# Patient Record
Sex: FEMALE | Race: WHITE | URBAN - METROPOLITAN AREA
[De-identification: names, ages, dates, MRNs, and addresses within clinical notes are randomized per-mention and may not be internally consistent; named-entity substitution may affect disease eponyms.]

---

## 2021-08-22 ENCOUNTER — EMERGENCY (EMERGENCY)
Facility: HOSPITAL | Age: 86
LOS: 0 days | Discharge: HOME | End: 2021-08-22
Attending: EMERGENCY MEDICINE
Payer: MEDICARE

## 2021-08-22 VITALS
TEMPERATURE: 99 F | HEART RATE: 78 BPM | SYSTOLIC BLOOD PRESSURE: 186 MMHG | OXYGEN SATURATION: 98 % | RESPIRATION RATE: 17 BRPM | WEIGHT: 117.95 LBS | DIASTOLIC BLOOD PRESSURE: 88 MMHG | HEIGHT: 59 IN
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
## 2021-08-22 DIAGNOSIS — S06.9X9A UNSPECIFIED INTRACRANIAL INJURY WITH LOSS OF CONSCIOUSNESS OF UNSPECIFIED DURATION, INITIAL ENCOUNTER: ICD-10-CM

## 2021-08-22 DIAGNOSIS — S09.90XA UNSPECIFIED INJURY OF HEAD, INITIAL ENCOUNTER: ICD-10-CM

## 2021-08-22 DIAGNOSIS — N39.0 URINARY TRACT INFECTION, SITE NOT SPECIFIED: ICD-10-CM
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
## 2021-08-22 DIAGNOSIS — S00.93XA CONTUSION OF UNSPECIFIED PART OF HEAD, INITIAL ENCOUNTER: ICD-10-CM

## 2021-08-22 LAB
ALBUMIN SERPL ELPH-MCNC: 3.7 G/DL — SIGNIFICANT CHANGE UP (ref 3.3–5)
ALP SERPL-CCNC: 58 U/L — SIGNIFICANT CHANGE UP (ref 40–120)
ALT FLD-CCNC: 23 U/L — SIGNIFICANT CHANGE UP (ref 12–78)
ANION GAP SERPL CALC-SCNC: 2 MMOL/L — LOW (ref 5–17)
APPEARANCE UR: CLEAR — SIGNIFICANT CHANGE UP
APTT BLD: 31.9 SEC — SIGNIFICANT CHANGE UP (ref 27.5–35.5)
AST SERPL-CCNC: 20 U/L — SIGNIFICANT CHANGE UP (ref 15–37)
BILIRUB SERPL-MCNC: 0.5 MG/DL — SIGNIFICANT CHANGE UP (ref 0.2–1.2)
BILIRUB UR-MCNC: NEGATIVE — SIGNIFICANT CHANGE UP
BUN SERPL-MCNC: 21 MG/DL — SIGNIFICANT CHANGE UP (ref 7–23)
CALCIUM SERPL-MCNC: 8.7 MG/DL — SIGNIFICANT CHANGE UP (ref 8.5–10.1)
CHLORIDE SERPL-SCNC: 110 MMOL/L — HIGH (ref 96–108)
CO2 SERPL-SCNC: 27 MMOL/L — SIGNIFICANT CHANGE UP (ref 22–31)
COLOR SPEC: YELLOW — SIGNIFICANT CHANGE UP
CREAT SERPL-MCNC: 0.76 MG/DL — SIGNIFICANT CHANGE UP (ref 0.5–1.3)
DIFF PNL FLD: ABNORMAL
GLUCOSE SERPL-MCNC: 99 MG/DL — SIGNIFICANT CHANGE UP (ref 70–99)
GLUCOSE UR QL: NEGATIVE MG/DL — SIGNIFICANT CHANGE UP
HCT VFR BLD CALC: 34.7 % — SIGNIFICANT CHANGE UP (ref 34.5–45)
HGB BLD-MCNC: 11.4 G/DL — LOW (ref 11.5–15.5)
INR BLD: 1.09 RATIO — SIGNIFICANT CHANGE UP (ref 0.88–1.16)
KETONES UR-MCNC: NEGATIVE — SIGNIFICANT CHANGE UP
LEUKOCYTE ESTERASE UR-ACNC: ABNORMAL
MCHC RBC-ENTMCNC: 31 PG — SIGNIFICANT CHANGE UP (ref 27–34)
MCHC RBC-ENTMCNC: 32.9 GM/DL — SIGNIFICANT CHANGE UP (ref 32–36)
MCV RBC AUTO: 94.3 FL — SIGNIFICANT CHANGE UP (ref 80–100)
NITRITE UR-MCNC: NEGATIVE — SIGNIFICANT CHANGE UP
PH UR: 7 — SIGNIFICANT CHANGE UP (ref 5–8)
PLATELET # BLD AUTO: 190 K/UL — SIGNIFICANT CHANGE UP (ref 150–400)
POTASSIUM SERPL-MCNC: 3.6 MMOL/L — SIGNIFICANT CHANGE UP (ref 3.5–5.3)
POTASSIUM SERPL-SCNC: 3.6 MMOL/L — SIGNIFICANT CHANGE UP (ref 3.5–5.3)
PROT SERPL-MCNC: 6.8 GM/DL — SIGNIFICANT CHANGE UP (ref 6–8.3)
PROT UR-MCNC: NEGATIVE MG/DL — SIGNIFICANT CHANGE UP
PROTHROM AB SERPL-ACNC: 12.7 SEC — SIGNIFICANT CHANGE UP (ref 10.6–13.6)
RBC # BLD: 3.68 M/UL — LOW (ref 3.8–5.2)
RBC # FLD: 13.6 % — SIGNIFICANT CHANGE UP (ref 10.3–14.5)
SODIUM SERPL-SCNC: 139 MMOL/L — SIGNIFICANT CHANGE UP (ref 135–145)
SP GR SPEC: 1.01 — SIGNIFICANT CHANGE UP (ref 1.01–1.02)
TROPONIN I SERPL-MCNC: <0.015 NG/ML — SIGNIFICANT CHANGE UP (ref 0.01–0.04)
TROPONIN I SERPL-MCNC: <0.015 NG/ML — SIGNIFICANT CHANGE UP (ref 0.01–0.04)
UROBILINOGEN FLD QL: NEGATIVE MG/DL — SIGNIFICANT CHANGE UP
WBC # BLD: 7.62 K/UL — SIGNIFICANT CHANGE UP (ref 3.8–10.5)
WBC # FLD AUTO: 7.62 K/UL — SIGNIFICANT CHANGE UP (ref 3.8–10.5)

## 2021-08-22 PROCEDURE — 99284 EMERGENCY DEPT VISIT MOD MDM: CPT | Mod: 25

## 2021-08-22 PROCEDURE — 36415 COLL VENOUS BLD VENIPUNCTURE: CPT

## 2021-08-22 PROCEDURE — 70450 CT HEAD/BRAIN W/O DYE: CPT | Mod: 26,MB

## 2021-08-22 PROCEDURE — 93010 ELECTROCARDIOGRAM REPORT: CPT

## 2021-08-22 PROCEDURE — 85610 PROTHROMBIN TIME: CPT

## 2021-08-22 PROCEDURE — 81001 URINALYSIS AUTO W/SCOPE: CPT

## 2021-08-22 PROCEDURE — 85730 THROMBOPLASTIN TIME PARTIAL: CPT

## 2021-08-22 PROCEDURE — 80053 COMPREHEN METABOLIC PANEL: CPT

## 2021-08-22 PROCEDURE — 70450 CT HEAD/BRAIN W/O DYE: CPT

## 2021-08-22 PROCEDURE — 84484 ASSAY OF TROPONIN QUANT: CPT | Mod: 91

## 2021-08-22 PROCEDURE — U0005: CPT

## 2021-08-22 PROCEDURE — 99284 EMERGENCY DEPT VISIT MOD MDM: CPT

## 2021-08-22 PROCEDURE — 87086 URINE CULTURE/COLONY COUNT: CPT

## 2021-08-22 PROCEDURE — 73502 X-RAY EXAM HIP UNI 2-3 VIEWS: CPT | Mod: RT

## 2021-08-22 PROCEDURE — 85027 COMPLETE CBC AUTOMATED: CPT

## 2021-08-22 PROCEDURE — 93005 ELECTROCARDIOGRAM TRACING: CPT

## 2021-08-22 PROCEDURE — 73502 X-RAY EXAM HIP UNI 2-3 VIEWS: CPT | Mod: 26,RT

## 2021-08-22 PROCEDURE — U0003: CPT

## 2021-08-22 RX ORDER — NITROFURANTOIN MACROCRYSTAL 50 MG
1 CAPSULE ORAL
Qty: 14 | Refills: 0
Start: 2021-08-22 | End: 2021-08-28

## 2021-08-22 RX ORDER — NITROFURANTOIN MACROCRYSTAL 50 MG
100 CAPSULE ORAL ONCE
Refills: 0 | Status: COMPLETED | OUTPATIENT
Start: 2021-08-22 | End: 2021-08-22

## 2021-08-22 RX ADMIN — Medication 100 MILLIGRAM(S): at 22:00

## 2021-08-22 NOTE — ED ADULT TRIAGE NOTE - CHIEF COMPLAINT QUOTE
patient BIBA s/p unwitnessed trip and fall at home. + head trauma +LOC, no complaints of pain at this time. Neurp alert called by chalo Romeo brought directly to CT

## 2021-08-22 NOTE — ED ADULT NURSE NOTE - BREATH SOUNDS, MLM
March 30, 2017      Joycelyn Vásquez MD  [address]  [facility] [address]           [facility] - Rheumatology  [address]  [facility] [address]  Phone: [phone]  Fax: [phone]          Patient: Sera Boone   MR Number: [id number]   YOB: 1935   Date of Visit: 3/30/2017       Dear Dr. Joycelyn Vásquez:    Thank you for referring Sera Boone to me for evaluation. Attached you will find relevant portions of my assessment and plan of care.    If you have questions, please do not hesitate to call me. I look forward to following Sera Boone along with you.    Sincerely,    Dayday Cheema MD    Enclosure  CC:  No Recipients    If you would like to receive this communication electronically, please contact [email or web address] or [phone] to request more information on [employer] access.    For providers and/or their staff who would like to refer a patient to [facility], please contact us through our one-stop-shop provider referral line, [facility], at [phone].    If you feel you have received this communication in error or would no longer like to receive these types of communications, please e-mail [email or web address]          Clear no

## 2021-08-22 NOTE — ED PROVIDER NOTE - CHIEF COMPLAINT
[facility] Call Center    Phone Message    May a detailed message be left on voicemail: yes     Reason for Call: Other: Pt was told she woould receive a third Rx, something for cold sores, nothing sent to [facility]vy. Please call to discuss     Action Taken: Message routed to:  Other: [facility] Derm    Travel Screening: Not Applicable                                                                       The patient is a 89y Female complaining of fall.

## 2021-08-22 NOTE — ED ADULT NURSE NOTE - OBJECTIVE STATEMENT
Patient called back and scheduled appointment for Wednesday, Ginger 10 at 8:40am with Dr. Malagon.     pt is 89 yo female bibems from home presents to ED for mechanical fall, pt states she tripped and fell, denies loc, denies any pain, no blood thinners.  Neuro alert initiated upon arrival to ED, pt sent to CT.

## 2021-08-22 NOTE — ED ADULT NURSE REASSESSMENT NOTE - NS ED NURSE REASSESS COMMENT FT1
Pt A&Ox4, received in stretcher anxious and wanting to go home. Plan of care and diagnostic testing reviewed with pt. Pt demonstrated understanding through teach back and was reassured. Pt assisted to bathroom with sarasteady. Pt in front of nursing station to maintain safety.

## 2021-08-22 NOTE — ED PROVIDER NOTE - PATIENT PORTAL LINK FT
You can access the FollowMyHealth Patient Portal offered by [facility] by registering at the following website: [email or web address]. By joining [employer]’s FollowMyHealth portal, you will also be able to view your health information using other applications (apps) compatible with our system.

## 2021-08-22 NOTE — ED ADULT NURSE REASSESSMENT NOTE - NS ED NURSE REASSESS COMMENT FT1
Second troponin drawn. Waiting for result. Plan of care discussed with karo Brasher informed of wait time.

## 2021-08-22 NOTE — ED ADULT NURSE REASSESSMENT NOTE - NS ED NURSE REASSESS COMMENT FT1
Spoke with chip Gan on the phone. Please call Elvia at [phone] if pt will be admitted. Please call johan Ann at [phone] (home) or [phone] (cell) if pt is discharged.

## 2021-08-22 NOTE — ED PROVIDER NOTE - OBJECTIVE STATEMENT
89 y/o female BIBA s/p unwitnessed fall at home. +Head trauma. +LOC. No other complaints at this time. Pt does not remember the event happening, or if she passed out. Pt lives alone. There are no other complaints at this time. NKDA.

## 2021-08-22 NOTE — ED PROVIDER NOTE - NSFOLLOWUPINSTRUCTIONS_ED_ALL_ED_FT
Contusion    A contusion is a deep bruise. Contusions are the result of a blunt injury to tissues and muscle fibers under the skin. The skin overlying the contusion may turn blue, purple, or yellow. Symptoms also include pain and swelling in the injured area.    SEEK IMMEDIATE MEDICAL CARE IF YOU HAVE ANY OF THE FOLLOWING SYMPTOMS: severe pain, numbness, tingling, pain, weakness, or skin color/temperature change in any part of your body distal to the injury.     Follow with PMD asap, return for any new or worsening symptoms Contusion, possible UTI    A contusion is a deep bruise. Contusions are the result of a blunt injury to tissues and muscle fibers under the skin. The skin overlying the contusion may turn blue, purple, or yellow. Symptoms also include pain and swelling in the injured area.    SEEK IMMEDIATE MEDICAL CARE IF YOU HAVE ANY OF THE FOLLOWING SYMPTOMS: severe pain, numbness, tingling, pain, weakness, or skin color/temperature change in any part of your body distal to the injury.     Macrobid prescribed    Follow with PMD asap, return for any new or worsening symptoms

## 2021-08-22 NOTE — ED PROVIDER NOTE - ENMT, MLM
Head atraumatic, normocephalic. No obvious signs of swelling/deformity. Battles negative. Raccoons negative. Airway patent, Nasal mucosa clear. Mouth with normal mucosa. Throat has no vesicles, no oropharyngeal exudates and uvula is midline.

## 2021-08-23 LAB
CULTURE RESULTS: SIGNIFICANT CHANGE UP
SPECIMEN SOURCE: SIGNIFICANT CHANGE UP

## 2022-12-05 ENCOUNTER — INPATIENT (INPATIENT)
Facility: HOSPITAL | Age: 87
LOS: 3 days | Discharge: HOME | DRG: 564 | End: 2022-12-09
Attending: HOSPITALIST | Admitting: INTERNAL MEDICINE
Payer: MEDICARE

## 2022-12-05 VITALS
WEIGHT: 119.93 LBS | HEART RATE: 91 BPM | DIASTOLIC BLOOD PRESSURE: 85 MMHG | OXYGEN SATURATION: 100 % | RESPIRATION RATE: 18 BRPM | HEIGHT: 66 IN | SYSTOLIC BLOOD PRESSURE: 155 MMHG

## 2022-12-05 LAB
ALBUMIN SERPL ELPH-MCNC: 3.3 G/DL — SIGNIFICANT CHANGE UP (ref 3.3–5)
ALP SERPL-CCNC: 61 U/L — SIGNIFICANT CHANGE UP (ref 40–120)
ALT FLD-CCNC: 22 U/L — SIGNIFICANT CHANGE UP (ref 12–78)
ANION GAP SERPL CALC-SCNC: 7 MMOL/L — SIGNIFICANT CHANGE UP (ref 5–17)
APPEARANCE UR: CLEAR — SIGNIFICANT CHANGE UP
AST SERPL-CCNC: 26 U/L — SIGNIFICANT CHANGE UP (ref 15–37)
BASOPHILS # BLD AUTO: 0.02 K/UL — SIGNIFICANT CHANGE UP (ref 0–0.2)
BASOPHILS NFR BLD AUTO: 0.1 % — SIGNIFICANT CHANGE UP (ref 0–2)
BILIRUB SERPL-MCNC: 0.8 MG/DL — SIGNIFICANT CHANGE UP (ref 0.2–1.2)
BILIRUB UR-MCNC: NEGATIVE — SIGNIFICANT CHANGE UP
BUN SERPL-MCNC: 23 MG/DL — SIGNIFICANT CHANGE UP (ref 7–23)
CALCIUM SERPL-MCNC: 8.7 MG/DL — SIGNIFICANT CHANGE UP (ref 8.5–10.1)
CHLORIDE SERPL-SCNC: 105 MMOL/L — SIGNIFICANT CHANGE UP (ref 96–108)
CK SERPL-CCNC: 527 U/L — HIGH (ref 26–192)
CO2 SERPL-SCNC: 26 MMOL/L — SIGNIFICANT CHANGE UP (ref 22–31)
COLOR SPEC: YELLOW — SIGNIFICANT CHANGE UP
CREAT SERPL-MCNC: 0.6 MG/DL — SIGNIFICANT CHANGE UP (ref 0.5–1.3)
DIFF PNL FLD: ABNORMAL
EGFR: 82 ML/MIN/1.73M2 — SIGNIFICANT CHANGE UP
EOSINOPHIL # BLD AUTO: 0 K/UL — SIGNIFICANT CHANGE UP (ref 0–0.5)
EOSINOPHIL NFR BLD AUTO: 0 % — SIGNIFICANT CHANGE UP (ref 0–6)
FLUAV AG NPH QL: SIGNIFICANT CHANGE UP
FLUBV AG NPH QL: SIGNIFICANT CHANGE UP
GLUCOSE SERPL-MCNC: 128 MG/DL — HIGH (ref 70–99)
GLUCOSE UR QL: NEGATIVE — SIGNIFICANT CHANGE UP
HCT VFR BLD CALC: 33.3 % — LOW (ref 34.5–45)
HGB BLD-MCNC: 10.9 G/DL — LOW (ref 11.5–15.5)
IMM GRANULOCYTES NFR BLD AUTO: 0.4 % — SIGNIFICANT CHANGE UP (ref 0–0.9)
KETONES UR-MCNC: ABNORMAL
LEUKOCYTE ESTERASE UR-ACNC: NEGATIVE — SIGNIFICANT CHANGE UP
LYMPHOCYTES # BLD AUTO: 0.3 K/UL — LOW (ref 1–3.3)
LYMPHOCYTES # BLD AUTO: 2.2 % — LOW (ref 13–44)
MAGNESIUM SERPL-MCNC: 2 MG/DL — SIGNIFICANT CHANGE UP (ref 1.6–2.6)
MCHC RBC-ENTMCNC: 30.2 PG — SIGNIFICANT CHANGE UP (ref 27–34)
MCHC RBC-ENTMCNC: 32.7 GM/DL — SIGNIFICANT CHANGE UP (ref 32–36)
MCV RBC AUTO: 92.2 FL — SIGNIFICANT CHANGE UP (ref 80–100)
MONOCYTES # BLD AUTO: 0.8 K/UL — SIGNIFICANT CHANGE UP (ref 0–0.9)
MONOCYTES NFR BLD AUTO: 5.8 % — SIGNIFICANT CHANGE UP (ref 2–14)
NEUTROPHILS # BLD AUTO: 12.51 K/UL — HIGH (ref 1.8–7.4)
NEUTROPHILS NFR BLD AUTO: 91.5 % — HIGH (ref 43–77)
NITRITE UR-MCNC: NEGATIVE — SIGNIFICANT CHANGE UP
NT-PROBNP SERPL-SCNC: 509 PG/ML — HIGH (ref 0–450)
PH UR: 6.5 — SIGNIFICANT CHANGE UP (ref 5–8)
PLATELET # BLD AUTO: 184 K/UL — SIGNIFICANT CHANGE UP (ref 150–400)
POTASSIUM SERPL-MCNC: 3.9 MMOL/L — SIGNIFICANT CHANGE UP (ref 3.5–5.3)
POTASSIUM SERPL-SCNC: 3.9 MMOL/L — SIGNIFICANT CHANGE UP (ref 3.5–5.3)
PROT SERPL-MCNC: 6.7 GM/DL — SIGNIFICANT CHANGE UP (ref 6–8.3)
PROT UR-MCNC: 15
RBC # BLD: 3.61 M/UL — LOW (ref 3.8–5.2)
RBC # FLD: 13.6 % — SIGNIFICANT CHANGE UP (ref 10.3–14.5)
RSV RNA NPH QL NAA+NON-PROBE: SIGNIFICANT CHANGE UP
SARS-COV-2 RNA SPEC QL NAA+PROBE: SIGNIFICANT CHANGE UP
SODIUM SERPL-SCNC: 138 MMOL/L — SIGNIFICANT CHANGE UP (ref 135–145)
SP GR SPEC: 1.01 — SIGNIFICANT CHANGE UP (ref 1.01–1.02)
TROPONIN I, HIGH SENSITIVITY RESULT: 197.82 NG/L — HIGH
UROBILINOGEN FLD QL: NEGATIVE — SIGNIFICANT CHANGE UP
WBC # BLD: 13.68 K/UL — HIGH (ref 3.8–10.5)
WBC # FLD AUTO: 13.68 K/UL — HIGH (ref 3.8–10.5)

## 2022-12-05 PROCEDURE — 99285 EMERGENCY DEPT VISIT HI MDM: CPT | Mod: FS

## 2022-12-05 PROCEDURE — 72125 CT NECK SPINE W/O DYE: CPT | Mod: 26,MA

## 2022-12-05 PROCEDURE — 74177 CT ABD & PELVIS W/CONTRAST: CPT | Mod: 26,MA

## 2022-12-05 PROCEDURE — 70450 CT HEAD/BRAIN W/O DYE: CPT | Mod: 26,MA

## 2022-12-05 PROCEDURE — 71045 X-RAY EXAM CHEST 1 VIEW: CPT | Mod: 26

## 2022-12-05 PROCEDURE — 71260 CT THORAX DX C+: CPT | Mod: 26,MA

## 2022-12-05 PROCEDURE — 93010 ELECTROCARDIOGRAM REPORT: CPT

## 2022-12-05 NOTE — ED PROVIDER NOTE - ATTENDING APP SHARED VISIT CONTRIBUTION OF CARE
Refilled Amlodipine per protocol per fax request.    Last filled 09/15/2017  Last seen 09/14/2017  Upcoming appt. 06/14/18 Pt eval, treatment and plan contemporaneously with ACP Ali. Agree with notes. Marifer FINLEY

## 2022-12-05 NOTE — ED PROVIDER NOTE - SKIN, MLM
Skin normal color for race, warm, dry and intact. No evidence of rash. Swelling and tenderness left occipital area.

## 2022-12-05 NOTE — ED PROVIDER NOTE - CLINICAL SUMMARY MEDICAL DECISION MAKING FREE TEXT BOX
Pt here s/p fall with head injury. Plan: labs, CT, reeval. Pt here s/p fall with head injury. Plan: labs, CT, reeval.    CT head NEG. +Elevated Troponin. spoke with hospitalist dr. Crain, will admit patient. ~Alejandro Cisneros PA-C

## 2022-12-05 NOTE — ED PROVIDER NOTE - PROGRESS NOTE DETAILS
spoke with hospitalist dr. Crain, will admit patient. ~Alejandro Cisneros PA-C · HPI Objective Statement: 89 y/o female with a PMHx of HTN, HLD presents to the ED s/p mechanical fall. Pt fell backwards and hit her head. No LOC. Pt was on the floor for 24 hours. No other complaints at this time. PA: PAtient is a 89 y/o female with PMHx of HTN, HLD who presents to [facility] c/o head injury s/p mechanical fall. Pt fell backwards and hit her head. No LOC. Pt was on the floor for 24 hours. No other complaints at this time. ~Alejandro Cisneros PA-C CT head NEG. +Elevated Troponin. spoke with hospitalist dr. Crain, will admit patient. ~Alejandro Cisneros PA-C

## 2022-12-05 NOTE — ED PROVIDER NOTE - PHYSICAL EXAMINATION
PA NOTE: GEN: AOX3, NAD. HEENT: Throat clear. Airway is patent. EYES: PERRLA. EOMI. Head: +Mild tenderness posterior scalp area. No deformity. NECK: Supple, No JVD. FROM. C-spine non-tender. CV:S1S2, RRR, LUNGS: Non-labored breathing, no tachypnea. O2sat 100% RA. CTA b/l. No w/r/r. CHEST: Equal chest expansion and rise. No deformity. ABD: Soft, NT/ND, no rebound, no guarding. No CVAT. EXT: No e/c/c. 2+ distal pulses. SKIN: No rashes. NEURO: No focal deficits. CN II-XII intact. FROM. 5/5 motor and sensory. ~Alejandro Cisneros PA-C

## 2022-12-05 NOTE — ED PROVIDER NOTE - OBJECTIVE STATEMENT
89 y/o female with a PMHx of HTN, HLD presents to the ED s/p mechanical fall. Pt fell backwards and hit her head. No LOC. Pt was on the floor for 24 hours. No other complaints at this time.

## 2022-12-05 NOTE — ED PROVIDER NOTE - NS ED ATTENDING STATEMENT MOD
This was a shared visit with the [facility]ARA. I reviewed and verified the documentation and independently performed the documented:

## 2022-12-05 NOTE — ED ADULT NURSE NOTE - OBJECTIVE STATEMENT
Per pt she was trying to get into bed last night and lost her balance and fell down hitting front of head, denies LOC, states she was able to get back up after the fall. Per triage note pt was laying on the ground since fall and found by niece. Pt denies being on blood thinners, no active bleeding or deformity noted. Pt states she normally uses a cane

## 2022-12-05 NOTE — ED ADULT TRIAGE NOTE - CHIEF COMPLAINT QUOTE
Pt BIBEMS s/p fall. Per EMS pt was on the floor for 24 hours. Pt was found today by her niece. Pt is unsure how she fell. Pt stated that she has a bump on her head. Pt denies any LOC or blood thinner use.

## 2022-12-05 NOTE — ED PROVIDER NOTE - DIFFERENTIAL DIAGNOSIS
Differential Diagnosis Head injury, scalp contusion, elevated troponin due to acs, elevated troponin due to stunned myocardium, elevated cpk due to immobilization after fall.

## 2022-12-05 NOTE — ED ADULT NURSE NOTE - NSIMPLEMENTINTERV_GEN_ALL_ED
Implemented All Fall with Harm Risk Interventions:  [place] to call system. Call bell, personal items and telephone within reach. Instruct patient to call for assistance. Room bathroom lighting operational. Non-slip footwear when patient is off stretcher. Physically safe environment: no spills, clutter or unnecessary equipment. Stretcher in lowest position, wheels locked, appropriate side rails in place. Provide visual cue, wrist band, yellow gown, etc. Monitor gait and stability. Monitor for mental status changes and reorient to person, place, and time. Review medications for side effects contributing to fall risk. Reinforce activity limits and safety measures with patient and family. Provide visual clues: red socks.

## 2022-12-06 DIAGNOSIS — S09.90XA UNSPECIFIED INJURY OF HEAD, INITIAL ENCOUNTER: ICD-10-CM

## 2022-12-06 PROBLEM — Z78.9 OTHER SPECIFIED HEALTH STATUS: Chronic | Status: ACTIVE | Noted: 2021-08-28

## 2022-12-06 LAB
CK SERPL-CCNC: 1202 U/L — HIGH (ref 26–192)
HCT VFR BLD CALC: 38 % — SIGNIFICANT CHANGE UP (ref 34.5–45)
HGB BLD-MCNC: 12.6 G/DL — SIGNIFICANT CHANGE UP (ref 11.5–15.5)
MCHC RBC-ENTMCNC: 30.9 PG — SIGNIFICANT CHANGE UP (ref 27–34)
MCHC RBC-ENTMCNC: 33.2 GM/DL — SIGNIFICANT CHANGE UP (ref 32–36)
MCV RBC AUTO: 93.1 FL — SIGNIFICANT CHANGE UP (ref 80–100)
PLATELET # BLD AUTO: 216 K/UL — SIGNIFICANT CHANGE UP (ref 150–400)
RBC # BLD: 4.08 M/UL — SIGNIFICANT CHANGE UP (ref 3.8–5.2)
RBC # FLD: 13.9 % — SIGNIFICANT CHANGE UP (ref 10.3–14.5)
TROPONIN I, HIGH SENSITIVITY RESULT: 214.35 NG/L — HIGH
TROPONIN I, HIGH SENSITIVITY RESULT: 215.79 NG/L — HIGH
WBC # BLD: 12.26 K/UL — HIGH (ref 3.8–10.5)
WBC # FLD AUTO: 12.26 K/UL — HIGH (ref 3.8–10.5)

## 2022-12-06 PROCEDURE — 36415 COLL VENOUS BLD VENIPUNCTURE: CPT

## 2022-12-06 PROCEDURE — 99223 1ST HOSP IP/OBS HIGH 75: CPT

## 2022-12-06 PROCEDURE — 80048 BASIC METABOLIC PNL TOTAL CA: CPT

## 2022-12-06 PROCEDURE — 82550 ASSAY OF CK (CPK): CPT

## 2022-12-06 PROCEDURE — 93306 TTE W/DOPPLER COMPLETE: CPT

## 2022-12-06 PROCEDURE — 99285 EMERGENCY DEPT VISIT HI MDM: CPT | Mod: 25

## 2022-12-06 PROCEDURE — 84484 ASSAY OF TROPONIN QUANT: CPT

## 2022-12-06 PROCEDURE — 97163 PT EVAL HIGH COMPLEX 45 MIN: CPT | Mod: GP

## 2022-12-06 PROCEDURE — 97110 THERAPEUTIC EXERCISES: CPT | Mod: GP

## 2022-12-06 PROCEDURE — 97116 GAIT TRAINING THERAPY: CPT | Mod: GP

## 2022-12-06 PROCEDURE — U0005: CPT

## 2022-12-06 PROCEDURE — 85027 COMPLETE CBC AUTOMATED: CPT

## 2022-12-06 PROCEDURE — U0003: CPT

## 2022-12-06 PROCEDURE — 80061 LIPID PANEL: CPT

## 2022-12-06 RX ORDER — ATORVASTATIN CALCIUM 80 MG/1
10 TABLET, FILM COATED ORAL AT BEDTIME
Refills: 0 | Status: DISCONTINUED | OUTPATIENT
Start: 2022-12-06 | End: 2022-12-09

## 2022-12-06 RX ORDER — LANOLIN ALCOHOL/MO/W.PET/CERES
3 CREAM (GRAM) TOPICAL AT BEDTIME
Refills: 0 | Status: DISCONTINUED | OUTPATIENT
Start: 2022-12-06 | End: 2022-12-09

## 2022-12-06 RX ORDER — AMLODIPINE BESYLATE 2.5 MG/1
2.5 TABLET ORAL DAILY
Refills: 0 | Status: DISCONTINUED | OUTPATIENT
Start: 2022-12-06 | End: 2022-12-09

## 2022-12-06 RX ORDER — ACETAMINOPHEN 500 MG
650 TABLET ORAL EVERY 6 HOURS
Refills: 0 | Status: DISCONTINUED | OUTPATIENT
Start: 2022-12-06 | End: 2022-12-09

## 2022-12-06 RX ORDER — ENOXAPARIN SODIUM 100 MG/ML
40 INJECTION SUBCUTANEOUS EVERY 24 HOURS
Refills: 0 | Status: DISCONTINUED | OUTPATIENT
Start: 2022-12-06 | End: 2022-12-09

## 2022-12-06 RX ORDER — ASPIRIN/CALCIUM CARB/MAGNESIUM 324 MG
81 TABLET ORAL DAILY
Refills: 0 | Status: DISCONTINUED | OUTPATIENT
Start: 2022-12-06 | End: 2022-12-09

## 2022-12-06 RX ORDER — LATANOPROST 0.05 MG/ML
1 SOLUTION/ DROPS OPHTHALMIC; TOPICAL AT BEDTIME
Refills: 0 | Status: DISCONTINUED | OUTPATIENT
Start: 2022-12-06 | End: 2022-12-09

## 2022-12-06 RX ORDER — ONDANSETRON 8 MG/1
4 TABLET, FILM COATED ORAL EVERY 8 HOURS
Refills: 0 | Status: DISCONTINUED | OUTPATIENT
Start: 2022-12-06 | End: 2022-12-09

## 2022-12-06 RX ORDER — METOPROLOL TARTRATE 50 MG
25 TABLET ORAL THREE TIMES A DAY
Refills: 0 | Status: DISCONTINUED | OUTPATIENT
Start: 2022-12-06 | End: 2022-12-09

## 2022-12-06 RX ORDER — DORZOLAMIDE HYDROCHLORIDE TIMOLOL MALEATE 20; 5 MG/ML; MG/ML
1 SOLUTION/ DROPS OPHTHALMIC
Refills: 0 | Status: DISCONTINUED | OUTPATIENT
Start: 2022-12-06 | End: 2022-12-09

## 2022-12-06 RX ORDER — SODIUM CHLORIDE 9 MG/ML
1000 INJECTION INTRAMUSCULAR; INTRAVENOUS; SUBCUTANEOUS
Refills: 0 | Status: DISCONTINUED | OUTPATIENT
Start: 2022-12-06 | End: 2022-12-08

## 2022-12-06 RX ADMIN — ENOXAPARIN SODIUM 40 MILLIGRAM(S): 100 INJECTION SUBCUTANEOUS at 12:50

## 2022-12-06 RX ADMIN — ATORVASTATIN CALCIUM 10 MILLIGRAM(S): 80 TABLET, FILM COATED ORAL at 22:21

## 2022-12-06 RX ADMIN — Medication 3 MILLIGRAM(S): at 22:21

## 2022-12-06 RX ADMIN — Medication 25 MILLIGRAM(S): at 22:21

## 2022-12-06 RX ADMIN — SODIUM CHLORIDE 75 MILLILITER(S): 9 INJECTION INTRAMUSCULAR; INTRAVENOUS; SUBCUTANEOUS at 17:28

## 2022-12-06 RX ADMIN — Medication 25 MILLIGRAM(S): at 12:50

## 2022-12-06 RX ADMIN — AMLODIPINE BESYLATE 2.5 MILLIGRAM(S): 2.5 TABLET ORAL at 13:32

## 2022-12-06 RX ADMIN — LATANOPROST 1 DROP(S): 0.05 SOLUTION/ DROPS OPHTHALMIC; TOPICAL at 22:22

## 2022-12-06 RX ADMIN — DORZOLAMIDE HYDROCHLORIDE TIMOLOL MALEATE 1 DROP(S): 20; 5 SOLUTION/ DROPS OPHTHALMIC at 22:21

## 2022-12-06 RX ADMIN — Medication 81 MILLIGRAM(S): at 12:50

## 2022-12-06 NOTE — PHYSICAL THERAPY INITIAL EVALUATION ADULT - GENERAL OBSERVATIONS, REHAB EVAL
Pt rec'd supine in bed in NAD with HM (Sinus Tachycardia 100-120) Intact. Pt denied any pain or discomfort

## 2022-12-06 NOTE — H&P ADULT - ASSESSMENT
89 y/o female with PMHX of HTN, HLD who presented to  after fall at home.  Patient was at home and fell backwards hitting her head.  Patient states it was a mechanical fall-- no syncope.  No LOC.  After the fall, patient was unable to get up off the floor and was lying on floor for 24 hours.  Upon admission, patient had trauma work up which was negative for bleed/ fracture.  Labs with elevated 's and elevated trop of 197.  Patient referred for admission.   89 y/o female with PMHX of HTN, HLD who presented to  after fall at home. Upon admission, patient had trauma work up which was negative for bleed/ fracture.  Labs with elevated 's and elevated trop of 197.  Patient referred for admission.      #Mild Rhabdo/ Elevated Trop:    Admit to tele.    NS @ 75.    Cardio eval.    Patient denies CP.    EKG with sinus tach-- no ischemic changes.    ASA/ statin.    ECHO.    Trend labs.      #Fall:    Mechanical as per patient.  Lost balance.    Walks with cane at home.    PT eval.    Imaging negative for fractures/ bleed.    Check orthostatics.      #Leukocytosis:    Afebrile.    CT imaging without infection.    UA negative.    Repeat-- may be stress related.      #HTN:   Resume home meds.  BB and Norvasc.      #HLD:    Cont statin.      #Glaucoma:    Cont drops.      #DVT Proph:  Lovenox.      DISPO:  PT eval.  ? REHAB.  Patient lives alone.

## 2022-12-06 NOTE — PHYSICAL THERAPY INITIAL EVALUATION ADULT - MODALITIES TREATMENT COMMENTS
Pt left as rec'd supine in bed in NAD with HM Intact. CBIR. Pt instructed to not get up alone. Bed alarm activated. RN Coreen present and aware.

## 2022-12-06 NOTE — H&P ADULT - NSHPREVIEWOFSYSTEMS_GEN_ALL_CORE
ROS:  General:  No fevers, chills, or unexplained weight loss  Skin: No rash or bothersome skin lesions  Musculoskeletal: No arthalgias, myalgias or joint swelling  Eyes: No visual changes or eye pain  Ears: No hearing loss , otorrhea or ear pain  Nose, Mouth, Throat: No nasal congestion, rhinorrhea, oral lesions, postnasal drip or sore throat  Cardio: No chest pain or palpitations. no lower extremity edema. no syncope. no claudication.   Respiratory: No cough, shortness of breath or wheezing   GI: No diarrhea, constipation, blood in stools, abdominal pain, vomiting or heartburn  : No urinary frequency, hematuria, incontinence, or dysuria  Neurologic: No headaches, parasthesias, confusion, dysarthria or gait instability  Psychiatric:  No anxiety or depression  Lymphatic:  No easy bruising, easy bleeding or swollen glands  Allergic: No itching, sneezing , watery eyes, clear rhinorrhea or recurrent infections ROS:  General:  No fevers, chills, or unexplained weight loss  Skin: No rash or bothersome skin lesions  Musculoskeletal: No arthalgias, myalgias or joint swelling  Eyes: No visual changes or eye pain  Ears: No hearing loss , otorrhea or ear pain  Nose, Mouth, Throat: No nasal congestion, rhinorrhea, oral lesions, postnasal drip or sore throat  Cardio: No chest pain or palpitations. no lower extremity edema. no syncope. no claudication.   Respiratory: No cough, shortness of breath or wheezing   GI: No diarrhea, constipation, blood in stools, abdominal pain, vomiting or heartburn  : No urinary frequency, hematuria, incontinence, or dysuria  Neurologic: fall  Psychiatric:  No anxiety or depression  Lymphatic:  No easy bruising, easy bleeding or swollen glands  Allergic: No itching, sneezing , watery eyes, clear rhinorrhea or recurrent infections

## 2022-12-06 NOTE — H&P ADULT - NSHPLABSRESULTS_GEN_ALL_CORE
10.9   13.68 )-----------( 184      ( 05 Dec 2022 19:05 )             33.3     12-05    138  |  105  |  23  ----------------------------<  128<H>  3.9   |  26  |  0.60    Ca    8.7      05 Dec 2022 19:05  Mg     2.0     12-05    TPro  6.7  /  Alb  3.3  /  TBili  0.8  /  DBili  x   /  AST  26  /  ALT  22  /  AlkPhos  61  12-05    CAPILLARY BLOOD GLUCOSE          Urinalysis Basic - ( 05 Dec 2022 22:20 )    Color: Yellow / Appearance: Clear / S.010 / pH: x  Gluc: x / Ketone: Small  / Bili: Negative / Urobili: Negative   Blood: x / Protein: 15 / Nitrite: Negative   Leuk Esterase: Negative / RBC: 3-5 /HPF / WBC Negative /HPF   Sq Epi: x / Non Sq Epi: Negative / Bacteria: Occasional 10.9   13.68 )-----------( 184      ( 05 Dec 2022 19:05 )             33.3     12-05    138  |  105  |  23  ----------------------------<  128<H>  3.9   |  26  |  0.60    Ca    8.7      05 Dec 2022 19:05  Mg     2.0     12-05    TPro  6.7  /  Alb  3.3  /  TBili  0.8  /  DBili  x   /  AST  26  /  ALT  22  /  AlkPhos  61  12-05    Urinalysis Basic - ( 05 Dec 2022 22:20 )  Color: Yellow / Appearance: Clear / S.010 / pH: x  Gluc: x / Ketone: Small  / Bili: Negative / Urobili: Negative   Blood: x / Protein: 15 / Nitrite: Negative   Leuk Esterase: Negative / RBC: 3-5 /HPF / WBC Negative /HPF   Sq Epi: x / Non Sq Epi: Negative / Bacteria: Occasional

## 2022-12-06 NOTE — H&P ADULT - HISTORY OF PRESENT ILLNESS
89 y/o female with PMHX of HTN, HLD who presented to  after fall at home.  Patient was at home and fell backwards hitting her head.  Patient states it was a mechanical fall-- no syncope.  No LOC.  After the fall, patient was unable to get up off the floor and was lying on floor for 24 hours.  Upon admission, patient had trauma work up which was negative for bleed/ fracture.  Labs with elevated 's and elevated trop of 197.  Patient referred for admission.        PAST MEDICAL & SURGICAL HISTORY:  as above      FAMILY HISTORY:  Patient denies family history.        Social History:    Lives at home.    No tob/ etoh/ drug use.      Allergies:  No Known Allergies       89 y/o female with PMHX of HTN, HLD who presented to  after fall at home.  Patient was at home and fell backwards hitting her head.  Patient states it was a mechanical fall-- no syncope.  She states she was standing at the foot of her bed and lost her balance.  No LOC.  She hit her head on the bed when she fell.  After the fall, patient was unable to get up off the floor and was lying on floor for 24 hours.  This was per ER notes.  At present, patient doesn't recall how long she was on the ground and how she got the hospital.  Upon admission, patient had trauma work up which was negative for bleed/ fracture.  Labs with elevated 's and elevated trop of 197.  Patient referred for admission.  Patient denies cardiac history or CP in last 2 days at home.  No complaints.        PAST MEDICAL & SURGICAL HISTORY:  as above      FAMILY HISTORY:  Patient denies family history.        Social History:    Lives at home.  Alone.    No tob/ etoh/ drug use.      Allergies:  No Known Allergies

## 2022-12-06 NOTE — PHYSICAL THERAPY INITIAL EVALUATION ADULT - GAIT DEVIATIONS NOTED, PT EVAL
[facility] OOB PT FUNCTIONAL MOBILITY ASSESSMENT AT THIS TIME DUE TO ELEVATED HR (SINUS TACHYCARDIA 100-120) AND UPTRENDING TROPONIN LEVELS. WILL ATTEMPT TO ASSESS OOB AMBULATION AND COMPLETE PT EVAL AT A LATER DATE/TIME UPON IMPROVEMENT IN CARDIAC/MEDICAL STATUS. JIMBO IBARRA AWARE AND IN AGREEMENT WITH PLAN

## 2022-12-06 NOTE — ED ADULT NURSE REASSESSMENT NOTE - NS ED NURSE REASSESS COMMENT FT1
Patient resting comfortably on stretcher, AxO2, no complaints at this time. Continuing to monitor at this time per MD orders

## 2022-12-06 NOTE — PATIENT PROFILE ADULT - FALL HARM RISK - HARM RISK INTERVENTIONS

## 2022-12-06 NOTE — PHARMACOTHERAPY INTERVENTION NOTE - COMMENTS
Medication history complete, went off DrFirst, tried multiple times to contact Elvia at [phone] and it went right to voicemail.

## 2022-12-06 NOTE — H&P ADULT - NSHPPHYSICALEXAM_GEN_ALL_CORE
PEx  T(C): 36.4 (12-06-22 @ 06:52), Max: 36.7 (12-05-22 @ 23:15)  HR: 76 (12-06-22 @ 06:52) (72 - 91)  BP: 161/76 (12-06-22 @ 06:52) (144/79 - 161/76)  RR: 17 (12-06-22 @ 06:52) (16 - 18)  SpO2: 99% (12-06-22 @ 06:52) (99% - 100%)  Wt(kg): --  General:     Well appearing, well nourished in no distress, no identifying marks , scars, or tattoos.  Skin: no rash or prominent lesions  Head: normocephalic, atraumatic     Sinuses: non-tender  Nose: no external lesions, mucosa non-inflamed, septum and turbinates normal  Throat: no erythema, exudates or lesions.  Neck: Supple without lymphadenopathy. Thyroid no thyromegaly, no palpable thyroid nodules, no palpable nodules or masses, carotid arteries no bruits.   Breasts: No palpable masses or lesions.  Heart: RRR, no murmur or gallop.  Normal S1, S2.  No S3, S4.   Lungs: CTA bilaterally, no wheezes, rhonchi, rales.  Breathing unlabored.   Chest wall: Normal insp   Abdomen:  Soft, NT/ND, normal bowel sounds, no HSM, no masses.  No peritoneal signs.   Back: spine normal without deformity or tenderness.  Normal ROM   : Exam normal.  no inguinal hernias.  Extremities: No deformities, clubbing, cyanosis, or edema.  Musculoskeletal: Normal gait and station. No decreased range of motion, instability, atrophy or abnormal strength or tone in the head, neck, spine, ribs, pelvis or extremities.   Neurologic: CN 2-12 normal. Sensation to pain, touch and proprioception normal. DTRs normal in upper and lower extremities. No pathologic reflexes.  Motor normal.  Psychiatric: Oriented X3, intact recent and remote memory, judgement and insight, normal mood and affect. PEx  T(C): 36.4 (12-06-22 @ 06:52), Max: 36.7 (12-05-22 @ 23:15)  HR: 76 (12-06-22 @ 06:52) (72 - 91)  BP: 161/76 (12-06-22 @ 06:52) (144/79 - 161/76)  RR: 17 (12-06-22 @ 06:52) (16 - 18)  SpO2: 99% (12-06-22 @ 06:52) (99% - 100%)    General:  elderly female.  NAD  Skin: no rash or prominent lesions  Head: normocephalic, atraumatic     Sinuses: non-tender  Nose: no external lesions, mucosa non-inflamed, septum and turbinates normal  Throat: no erythema, exudates or lesions.  Neck: Supple without lymphadenopathy. Thyroid no thyromegaly, no palpable thyroid nodules, no palpable nodules or masses, carotid arteries no bruits.   Breasts: No palpable masses or lesions.  Heart: RRR, no murmur or gallop.  Normal S1, S2.  No S3, S4.   Lungs: CTA bilaterally, no wheezes, rhonchi, rales.  Breathing unlabored.   Chest wall: Normal insp   Abdomen:  Soft, NT/ND, normal bowel sounds, no HSM, no masses.  No peritoneal signs.   Back: spine normal without deformity or tenderness.  Normal ROM   : Exam normal.  no inguinal hernias.  Extremities: No deformities, clubbing, cyanosis, or edema.  Musculoskeletal: Normal gait and station. No decreased range of motion, instability, atrophy or abnormal strength or tone in the head, neck, spine, ribs, pelvis or extremities.   Neurologic: CN 2-12 normal. Sensation to pain, touch and proprioception normal. DTRs normal in upper and lower extremities. No pathologic reflexes.  Motor normal.  Psychiatric: Oriented X3, intact recent and remote memory, judgement and insight, normal mood and affect. PEx  T(C): 36.4 (12-06-22 @ 06:52), Max: 36.7 (12-05-22 @ 23:15)  HR: 76 (12-06-22 @ 06:52) (72 - 91)  BP: 161/76 (12-06-22 @ 06:52) (144/79 - 161/76)  RR: 17 (12-06-22 @ 06:52) (16 - 18)  SpO2: 99% (12-06-22 @ 06:52) (99% - 100%)    General:  elderly female.  NAD  Skin: no rash or prominent lesions  Head: normocephalic, atraumatic     Sinuses: non-tender  Nose: no external lesions, mucosa non-inflamed, septum and turbinates normal  Throat: no erythema, exudates or lesions.  Neck: Supple without lymphadenopathy. Thyroid no thyromegaly, no palpable thyroid nodules, no palpable nodules or masses, carotid arteries no bruits.   Breasts: No palpable masses or lesions.  Heart: RRR, no murmur or gallop.  Normal S1, S2.  No S3, S4.   Lungs: CTA bilaterally, no wheezes, rhonchi, rales.  Breathing unlabored.   Chest wall: Normal insp   Abdomen:  Soft, NT/ND, normal bowel sounds, no HSM, no masses.  No peritoneal signs.   Back: spine normal without deformity or tenderness.  Normal ROM   : Exam normal.  no inguinal hernias.  Extremities: No deformities, clubbing, cyanosis, or edema.  Musculoskeletal: Normal gait and station. No decreased range of motion, instability, atrophy or abnormal strength or tone in the head, neck, spine, ribs, pelvis or extremities.   Neurologic: CN 2-12 normal. Sensation to pain, touch and proprioception normal. DTRs normal in upper and lower extremities. No pathologic reflexes.  Motor normal.  Psychiatric: alert and awake.  confused about events that led to admission,

## 2022-12-07 LAB
ADD ON TEST-SPECIMEN IN LAB: SIGNIFICANT CHANGE UP
ANION GAP SERPL CALC-SCNC: 5 MMOL/L — SIGNIFICANT CHANGE UP (ref 5–17)
BUN SERPL-MCNC: 24 MG/DL — HIGH (ref 7–23)
CALCIUM SERPL-MCNC: 8.4 MG/DL — LOW (ref 8.5–10.1)
CHLORIDE SERPL-SCNC: 109 MMOL/L — HIGH (ref 96–108)
CHOLEST SERPL-MCNC: 185 MG/DL — SIGNIFICANT CHANGE UP
CO2 SERPL-SCNC: 25 MMOL/L — SIGNIFICANT CHANGE UP (ref 22–31)
CREAT SERPL-MCNC: 0.56 MG/DL — SIGNIFICANT CHANGE UP (ref 0.5–1.3)
EGFR: 83 ML/MIN/1.73M2 — SIGNIFICANT CHANGE UP
GLUCOSE SERPL-MCNC: 105 MG/DL — HIGH (ref 70–99)
HCT VFR BLD CALC: 33.1 % — LOW (ref 34.5–45)
HDLC SERPL-MCNC: 73 MG/DL — SIGNIFICANT CHANGE UP
HGB BLD-MCNC: 10.8 G/DL — LOW (ref 11.5–15.5)
LIPID PNL WITH DIRECT LDL SERPL: 100 MG/DL — HIGH
MCHC RBC-ENTMCNC: 30.3 PG — SIGNIFICANT CHANGE UP (ref 27–34)
MCHC RBC-ENTMCNC: 32.6 GM/DL — SIGNIFICANT CHANGE UP (ref 32–36)
MCV RBC AUTO: 93 FL — SIGNIFICANT CHANGE UP (ref 80–100)
NON HDL CHOLESTEROL: 112 MG/DL — SIGNIFICANT CHANGE UP
PLATELET # BLD AUTO: 190 K/UL — SIGNIFICANT CHANGE UP (ref 150–400)
POTASSIUM SERPL-MCNC: 4 MMOL/L — SIGNIFICANT CHANGE UP (ref 3.5–5.3)
POTASSIUM SERPL-SCNC: 4 MMOL/L — SIGNIFICANT CHANGE UP (ref 3.5–5.3)
RBC # BLD: 3.56 M/UL — LOW (ref 3.8–5.2)
RBC # FLD: 14.1 % — SIGNIFICANT CHANGE UP (ref 10.3–14.5)
SODIUM SERPL-SCNC: 139 MMOL/L — SIGNIFICANT CHANGE UP (ref 135–145)
TRIGL SERPL-MCNC: 59 MG/DL — SIGNIFICANT CHANGE UP
WBC # BLD: 11.18 K/UL — HIGH (ref 3.8–10.5)
WBC # FLD AUTO: 11.18 K/UL — HIGH (ref 3.8–10.5)

## 2022-12-07 PROCEDURE — 93306 TTE W/DOPPLER COMPLETE: CPT | Mod: 26

## 2022-12-07 PROCEDURE — 99233 SBSQ HOSP IP/OBS HIGH 50: CPT

## 2022-12-07 RX ADMIN — Medication 25 MILLIGRAM(S): at 22:04

## 2022-12-07 RX ADMIN — SODIUM CHLORIDE 75 MILLILITER(S): 9 INJECTION INTRAMUSCULAR; INTRAVENOUS; SUBCUTANEOUS at 05:18

## 2022-12-07 RX ADMIN — DORZOLAMIDE HYDROCHLORIDE TIMOLOL MALEATE 1 DROP(S): 20; 5 SOLUTION/ DROPS OPHTHALMIC at 22:05

## 2022-12-07 RX ADMIN — Medication 25 MILLIGRAM(S): at 05:19

## 2022-12-07 RX ADMIN — Medication 25 MILLIGRAM(S): at 15:34

## 2022-12-07 RX ADMIN — AMLODIPINE BESYLATE 2.5 MILLIGRAM(S): 2.5 TABLET ORAL at 10:48

## 2022-12-07 RX ADMIN — ENOXAPARIN SODIUM 40 MILLIGRAM(S): 100 INJECTION SUBCUTANEOUS at 10:48

## 2022-12-07 RX ADMIN — ATORVASTATIN CALCIUM 10 MILLIGRAM(S): 80 TABLET, FILM COATED ORAL at 22:04

## 2022-12-07 RX ADMIN — Medication 81 MILLIGRAM(S): at 10:48

## 2022-12-07 RX ADMIN — DORZOLAMIDE HYDROCHLORIDE TIMOLOL MALEATE 1 DROP(S): 20; 5 SOLUTION/ DROPS OPHTHALMIC at 15:45

## 2022-12-07 NOTE — PROGRESS NOTE ADULT - ASSESSMENT
89 y/o female with PMHX of HTN, HLD who presented to  after fall at home. Upon admission, patient had trauma work up which was negative for bleed/ fracture.  Labs with elevated 's and elevated trop of 197.  Patient referred for admission.      #Mild Rhabdo/ Elevated Trop:   -tele  -CPK downtrended  -trop plateaud  -echo unremarkable as above, EKG non ischemic   -IVF for mild rhabdo, can continue for now  -ASA, statin  -f/u cards recs    #Fall:    Mechanical as per patient.  Lost balance.    Walks with cane at home.  -orthostatics  -imaging neg for fx/traumatic injury  -PT eval      #Leukocytosis  -afebrile  -likely stress related, downtrending  -UA neg  -CT w/o source infection    #HTN:   -Resume home meds.  BB and Norvasc.      #HLD:    -Cont statin.      #Glaucoma:    -Cont drops.      #DVT pp- SQL    #?KIKI, possibly 12/8        89 y/o female with PMHX of HTN, HLD who presented to  after fall at home. Upon admission, patient had trauma work up which was negative for bleed/ fracture.  Labs with elevated 's and elevated trop of 197.  Patient referred for admission.      #Mild Rhabdo/ Elevated Trop:   -tele  -CPK downtrended  -trop plateaud  -echo unremarkable as above, EKG non ischemic   -IVF for mild rhabdo, can continue for now  -ASA, statin  -f/u cards recs    #Fall:    Mechanical as per patient.  Lost balance.    Walks with cane at home.  -orthostatics  -imaging neg for fx/traumatic injury  -PT eval      #Leukocytosis  -afebrile  -likely stress related, downtrending  -UA neg  -CT w/o source infection    #HTN:   -Resume home meds.  BB and Norvasc.      #HLD:    -Cont statin.      #Glaucoma:    -Cont drops.      #DVT pp- SQL    #?KIKI, possibly 12/8

## 2022-12-08 LAB — SARS-COV-2 RNA SPEC QL NAA+PROBE: SIGNIFICANT CHANGE UP

## 2022-12-08 PROCEDURE — 99232 SBSQ HOSP IP/OBS MODERATE 35: CPT

## 2022-12-08 RX ADMIN — Medication 25 MILLIGRAM(S): at 13:38

## 2022-12-08 RX ADMIN — Medication 25 MILLIGRAM(S): at 21:15

## 2022-12-08 RX ADMIN — DORZOLAMIDE HYDROCHLORIDE TIMOLOL MALEATE 1 DROP(S): 20; 5 SOLUTION/ DROPS OPHTHALMIC at 11:38

## 2022-12-08 RX ADMIN — ATORVASTATIN CALCIUM 10 MILLIGRAM(S): 80 TABLET, FILM COATED ORAL at 21:15

## 2022-12-08 RX ADMIN — AMLODIPINE BESYLATE 2.5 MILLIGRAM(S): 2.5 TABLET ORAL at 10:43

## 2022-12-08 RX ADMIN — ENOXAPARIN SODIUM 40 MILLIGRAM(S): 100 INJECTION SUBCUTANEOUS at 12:15

## 2022-12-08 RX ADMIN — DORZOLAMIDE HYDROCHLORIDE TIMOLOL MALEATE 1 DROP(S): 20; 5 SOLUTION/ DROPS OPHTHALMIC at 21:15

## 2022-12-08 RX ADMIN — Medication 81 MILLIGRAM(S): at 11:38

## 2022-12-08 RX ADMIN — LATANOPROST 1 DROP(S): 0.05 SOLUTION/ DROPS OPHTHALMIC; TOPICAL at 21:15

## 2022-12-08 RX ADMIN — Medication 25 MILLIGRAM(S): at 05:23

## 2022-12-08 NOTE — PROGRESS NOTE ADULT - SUBJECTIVE AND OBJECTIVE BOX
HOSPITALIST ATTENDING PROGRESS NOTE    Chart and meds reviewed.  Patient seen and examined.    CC: fall    Subjective: No new complaints. Denies CP, SOB, palpitations. Family reports heat in home will be fixed by tmrw.     All other systems reviewed and found to be negative with the exception of what has been described above.    MEDICATIONS  (STANDING):  amLODIPine   Tablet 2.5 milliGRAM(s) Oral daily  aspirin enteric coated 81 milliGRAM(s) Oral daily  atorvastatin 10 milliGRAM(s) Oral at bedtime  dorzolamide 2%/timolol 0.5% Ophthalmic Solution 1 Drop(s) Both EYES two times a day  enoxaparin Injectable 40 milliGRAM(s) SubCutaneous every 24 hours  latanoprost 0.005% Ophthalmic Solution 1 Drop(s) Both EYES at bedtime  metoprolol tartrate 25 milliGRAM(s) Oral three times a day    MEDICATIONS  (PRN):  acetaminophen     Tablet .. 650 milliGRAM(s) Oral every 6 hours PRN Temp greater or equal to 38C (100.4F), Mild Pain (1 - 3)  aluminum hydroxide/magnesium hydroxide/simethicone Suspension 30 milliLiter(s) Oral every 4 hours PRN Dyspepsia  melatonin 3 milliGRAM(s) Oral at bedtime PRN Insomnia  ondansetron Injectable 4 milliGRAM(s) IV Push every 8 hours PRN Nausea and/or Vomiting      VITALS:  T(F): 98.2 (12-08-22 @ 07:58), Max: 98.4 (12-07-22 @ 20:14)  HR: 78 (12-08-22 @ 13:37) (78 - 92)  BP: 116/66 (12-08-22 @ 13:37) (116/66 - 161/64)  RR: 18 (12-08-22 @ 13:37) (17 - 18)  SpO2: 93% (12-08-22 @ 13:37) (93% - 95%)  Wt(kg): --    I&O's Summary    07 Dec 2022 07:01  -  08 Dec 2022 07:00  --------------------------------------------------------  IN: 1230 mL / OUT: 2 mL / NET: 1228 mL        CAPILLARY BLOOD GLUCOSE          PHYSICAL EXAM:  Gen: NAD  HEENT:  pupils equal and reactive, EOMI, no oropharyngeal lesions, erythema, exudates, oral thrush  NECK:   supple, no carotid bruits, no palpable lymph nodes, no thyromegaly  CV:  +S1, +S2, regular, no murmurs or rubs  RESP:   lungs clear to auscultation bilaterally, no wheezing, rales, rhonchi, good air entry bilaterally  BREAST:  not examined  GI:  abdomen soft, non-tender, non-distended, normal BS, no bruits, no abdominal masses, no palpable masses  RECTAL:  not examined  :  not examined  MSK:   normal muscle tone, no atrophy, no rigidity, no contractions  EXT:  no clubbing, no cyanosis, no edema, no calf pain, swelling or erythema  VASCULAR:  pulses equal and symmetric in the upper and lower extremities  NEURO:  AAOX3, no focal neurological deficits, follows all commands, able to move extremities spontaneously  SKIN:  no ulcers, lesions or rashes    LABS:                            10.8   11.18 )-----------( 190      ( 07 Dec 2022 06:37 )             33.1     12-07    139  |  109<H>  |  24<H>  ----------------------------<  105<H>  4.0   |  25  |  0.56    Ca    8.4<L>      07 Dec 2022 06:37      CARDIAC MARKERS ( 07 Dec 2022 06:37 )  x     / x     / 705 U/L / x     / x      CARDIAC MARKERS ( 06 Dec 2022 17:28 )  x     / x     / 1202 U/L / x     / x        CULTURES:  no new    Additional results/Imaging, I have personally reviewed:  CXR 12/5/22: No acute findings in this study or concurrent chest CT    CTH, CT cspine 12/5/22: CT HEAD: No CT evidence of acute intracranial hemorrhage. Atherosclerotic changes. If patient demonstrates persistent headaches or altered mental status, consider further evaluation via MR imaging to include DWI and ADC mapping techniques, along with gradient echo acquisition technique, provided there are no contraindications. CT CERVICAL SPINE:  No acute fracture. No AP plane subluxation. Multilevel degenerative changes, as described level by level in detail above.  If there is clinical concern for myelopathy or radiculopathy, consider further evaluation via MR imaging of the cervical spine, provided the patient has no contraindications.    CT c/a/p w iv contrast 12/5/22: No acute abnormality.    Echo 12/7/22: The left ventricle is normal in size, wall thickness, wall motion but does appear mildly hyperdynamic. Estimated left ventricularejection fraction is 70 %. Normal appearing left atrium. Normal appearing right ventricle structure and function. The aortic valve is well visualized, appears sclerotic. Valve opening seems to be normal. Mild (1+) aortic regurgitation is present. The mitral valve leaflets appear thickened. EA reversal of the mitral inflow consistent with reduced compliance of the left ventricle. Mild (1+) mitral regurgitation is present. The tricuspid valve leaflets appear mildly thickened and/or calcified, but open well. Mild (1+) tricuspid valve regurgitation is present.    Telemetry, personally reviewed:  12/7/22: sinus , PVCs, PACs  12/8/22: sinus , APCs, d/c tele  
HOSPITALIST ATTENDING PROGRESS NOTE    Chart and meds reviewed.  Patient seen and examined.    CC: fall    Subjective: Reports feeling well, ok appetite. Denies dizziness, CP, SOB.    All other systems reviewed and found to be negative with the exception of what has been described above.    MEDICATIONS  (STANDING):  amLODIPine   Tablet 2.5 milliGRAM(s) Oral daily  aspirin enteric coated 81 milliGRAM(s) Oral daily  atorvastatin 10 milliGRAM(s) Oral at bedtime  dorzolamide 2%/timolol 0.5% Ophthalmic Solution 1 Drop(s) Both EYES two times a day  enoxaparin Injectable 40 milliGRAM(s) SubCutaneous every 24 hours  latanoprost 0.005% Ophthalmic Solution 1 Drop(s) Both EYES at bedtime  metoprolol tartrate 25 milliGRAM(s) Oral three times a day  sodium chloride 0.9%. 1000 milliLiter(s) (75 mL/Hr) IV Continuous <Continuous>    MEDICATIONS  (PRN):  acetaminophen     Tablet .. 650 milliGRAM(s) Oral every 6 hours PRN Temp greater or equal to 38C (100.4F), Mild Pain (1 - 3)  aluminum hydroxide/magnesium hydroxide/simethicone Suspension 30 milliLiter(s) Oral every 4 hours PRN Dyspepsia  melatonin 3 milliGRAM(s) Oral at bedtime PRN Insomnia  ondansetron Injectable 4 milliGRAM(s) IV Push every 8 hours PRN Nausea and/or Vomiting      VITALS:  T(F): 97.7 (22 @ 08:00), Max: 98.4 (22 @ 20:05)  HR: 73 (22 @ 08:00) (69 - 81)  BP: 136/67 (22 @ 08:00) (128/65 - 136/67)  RR: 18 (22 @ 08:00) (18 - 18)  SpO2: 96% (22 @ 08:00) (96% - 100%)  Wt(kg): --    I&O's Summary    06 Dec 2022 07:01  -  07 Dec 2022 07:00  --------------------------------------------------------  IN: 825 mL / OUT: 600 mL / NET: 225 mL        CAPILLARY BLOOD GLUCOSE          PHYSICAL EXAM:  Gen: NAD  HEENT:  pupils equal and reactive, EOMI, no oropharyngeal lesions, erythema, exudates, oral thrush  NECK:   supple, no carotid bruits, no palpable lymph nodes, no thyromegaly  CV:  +S1, +S2, regular, no murmurs or rubs  RESP:   lungs clear to auscultation bilaterally, no wheezing, rales, rhonchi, good air entry bilaterally  BREAST:  not examined  GI:  abdomen soft, non-tender, non-distended, normal BS, no bruits, no abdominal masses, no palpable masses  RECTAL:  not examined  :  not examined  MSK:   normal muscle tone, no atrophy, no rigidity, no contractions  EXT:  no clubbing, no cyanosis, no edema, no calf pain, swelling or erythema  VASCULAR:  pulses equal and symmetric in the upper and lower extremities  NEURO:  AAOX2, no focal neurological deficits, follows all commands, able to move extremities spontaneously  SKIN:  no ulcers, lesions or rashes    LABS:                            10.8   11.18 )-----------( 190      ( 07 Dec 2022 06:37 )             33.1     12-07    139  |  109<H>  |  24<H>  ----------------------------<  105<H>  4.0   |  25  |  0.56    Ca    8.4<L>      07 Dec 2022 06:37  Mg     2.0     12-05    TPro  6.7  /  Alb  3.3  /  TBili  0.8  /  DBili  x   /  AST  26  /  ALT  22  /  AlkPhos  61  12-05    CARDIAC MARKERS ( 07 Dec 2022 06:37 )  x     / x     / 705 U/L / x     / x      CARDIAC MARKERS ( 06 Dec 2022 17:28 )  x     / x     / 1202 U/L / x     / x      CARDIAC MARKERS ( 05 Dec 2022 19:05 )  x     / x     / 527 U/L / x     / x          LIVER FUNCTIONS - ( 05 Dec 2022 19:05 )  Alb: 3.3 g/dL / Pro: 6.7 gm/dL / ALK PHOS: 61 U/L / ALT: 22 U/L / AST: 26 U/L / GGT: x             Urinalysis Basic - ( 05 Dec 2022 22:20 )    Color: Yellow / Appearance: Clear / S.010 / pH: x  Gluc: x / Ketone: Small  / Bili: Negative / Urobili: Negative   Blood: x / Protein: 15 / Nitrite: Negative   Leuk Esterase: Negative / RBC: 3-5 /HPF / WBC Negative /HPF   Sq Epi: x / Non Sq Epi: Negative / Bacteria: Occasional    CULTURES:  no new    Additional results/Imaging, I have personally reviewed:  CXR 22: No acute findings in this study or concurrent chest CT    CTH, CT cspine 22: CT HEAD: No CT evidence of acute intracranial hemorrhage. Atherosclerotic changes. If patient demonstrates persistent headaches or altered mental status, consider further evaluation via MR imaging to include DWI and ADC mapping techniques, along with gradient echo acquisition technique, provided there are no contraindications. CT CERVICAL SPINE:  No acute fracture. No AP plane subluxation. Multilevel degenerative changes, as described level by level in detail above.  If there is clinical concern for myelopathy or radiculopathy, consider further evaluation via MR imaging of the cervical spine, provided the patient has no contraindications.    CT c/a/p w iv contrast 22: No acute abnormality.    Echo 22: The left ventricle is normal in size, wall thickness, wall motion but does appear mildly hyperdynamic. Estimated left ventricularejection fraction is 70 %. Normal appearing left atrium. Normal appearing right ventricle structure and function. The aortic valve is well visualized, appears sclerotic. Valve opening seems to be normal. Mild (1+) aortic regurgitation is present. The mitral valve leaflets appear thickened. EA reversal of the mitral inflow consistent with reduced compliance of the left ventricle. Mild (1+) mitral regurgitation is present. The tricuspid valve leaflets appear mildly thickened and/or calcified, but open well. Mild (1+) tricuspid valve regurgitation is present.    Telemetry, personally reviewed:  22: sinus , PVCs, PACs

## 2022-12-08 NOTE — DIETITIAN INITIAL EVALUATION ADULT - ORAL INTAKE PTA/DIET HISTORY
Pt lives at home alone; reports "okay" appetite & consuming 3 meals/day, but consumes small meals typically, likely meeting <75% of ENN x "several years."

## 2022-12-08 NOTE — DIETITIAN INITIAL EVALUATION ADULT - ADD RECOMMEND
1) Liberalize diet to regular to maximize caloric and nutrient intake.  2) Add magic cup 1x/day   3) Monitor bowel movements, if no BM for >3 days, consider implementing bowel regimen.  4) Recommend to add MVI w/minerals, Vit C 500 mg BID, add Zinc Sulfate 220 mg x 10 days to promote wound healing.  5) Consider adding thiamine 200 mg daily 2/2 poor PO intake/ malnutrition  6) Encourage protein-rich foods, maximize food preferences  7) Maintain aspiration precautions, back of bed >35 degrees.  8) Confirm goals of care regarding nutrition support.   RD will continue to monitor PO intake, labs, hydration, and wt prn.

## 2022-12-08 NOTE — DIETITIAN INITIAL EVALUATION ADULT - OTHER INFO
89 y/o female with PMHX of HTN, HLD who presented to  after fall at home.  Patient was at home and fell backwards hitting her head.  Patient states it was a mechanical fall-- no syncope.  She states she was standing at the foot of her bed and lost her balance.  No LOC.  She hit her head on the bed when she fell.  After the fall, patient was unable to get up off the floor and was lying on floor for 24 hours.  This was per ER notes.  At present, patient doesn't recall how long she was on the ground and how she got the hospital  Admit for fall, Mild Rhabdo    Pt reports decreased appetite & "not eating well" since admit (x 2 days) 2/2 early satiety. Reports UBW of 125# x 2 yrs ago, now endores wt of 115# x 3 mo.  Bed scale wt of 106# taken by RD on 12/8/22. Unintentional wt loss of 9# / 7.8% wt loss x 3 mo; severe & clinically significant. NFPE reveals severe muscle/ fat wasting - pt appears thin, frail, ambar, and malnourished. Liberalize diet to regular to maximize caloric and nutrient intake. Add magic cup 1x/day - pt refuses all other supplements - pt is receptive. RD educated pt on high kcal & high pro foods - pt is receptive.

## 2022-12-08 NOTE — DIETITIAN INITIAL EVALUATION ADULT - NSFNSGIIOFT_GEN_A_CORE
I&O's Detail    07 Dec 2022 07:01  -  08 Dec 2022 07:00  --------------------------------------------------------  IN:    Oral Fluid: 480 mL    sodium chloride 0.9%: 750 mL  Total IN: 1230 mL    OUT:    Voided (mL): 2 mL  Total OUT: 2 mL    Total NET: 1228 mL

## 2022-12-08 NOTE — DIETITIAN INITIAL EVALUATION ADULT - PERTINENT LABORATORY DATA
12-07    139  |  109<H>  |  24<H>  ----------------------------<  105<H>  4.0   |  25  |  0.56    Ca    8.4<L>      07 Dec 2022 06:37

## 2022-12-08 NOTE — DIETITIAN INITIAL EVALUATION ADULT - PERTINENT MEDS FT
MEDICATIONS  (STANDING):  amLODIPine   Tablet 2.5 milliGRAM(s) Oral daily  aspirin enteric coated 81 milliGRAM(s) Oral daily  atorvastatin 10 milliGRAM(s) Oral at bedtime  dorzolamide 2%/timolol 0.5% Ophthalmic Solution 1 Drop(s) Both EYES two times a day  enoxaparin Injectable 40 milliGRAM(s) SubCutaneous every 24 hours  latanoprost 0.005% Ophthalmic Solution 1 Drop(s) Both EYES at bedtime  metoprolol tartrate 25 milliGRAM(s) Oral three times a day    MEDICATIONS  (PRN):  acetaminophen     Tablet .. 650 milliGRAM(s) Oral every 6 hours PRN Temp greater or equal to 38C (100.4F), Mild Pain (1 - 3)  aluminum hydroxide/magnesium hydroxide/simethicone Suspension 30 milliLiter(s) Oral every 4 hours PRN Dyspepsia  melatonin 3 milliGRAM(s) Oral at bedtime PRN Insomnia  ondansetron Injectable 4 milliGRAM(s) IV Push every 8 hours PRN Nausea and/or Vomiting

## 2022-12-08 NOTE — PROGRESS NOTE ADULT - ASSESSMENT
89 y/o female with PMHX of HTN, HLD who presented to  after fall at home. Upon admission, patient had trauma work up which was negative for bleed/ fracture.  Labs with elevated 's and elevated trop of 197.  Patient referred for admission.      #Mild Rhabdo/ Elevated Trop:   -tele  -CPK downtrended  -trop plateaud  -echo unremarkable as above, EKG non ischemic   -IVF for mild rhabdo given  -ASA, statin    #Fall:    Mechanical as per patient.  Lost balance.    Walks with cane at home.  -orthostatics neg  -imaging neg for fx/traumatic injury  -PT eval appreciated      #Leukocytosis  -afebrile  -likely stress related, downtrending  -UA neg  -CT w/o source infection    #HTN:   -Resume home meds.  BB and Norvasc.      #HLD:    -Cont statin.      #Glaucoma:    -Cont drops.      #DVT pp- SQL    #for d/c 12/9 w family, F2F

## 2022-12-08 NOTE — PROGRESS NOTE ADULT - NUTRITIONAL ASSESSMENT
This patient has been assessed with a concern for Malnutrition and has been determined to have a diagnosis/diagnoses of Severe protein-calorie malnutrition.    This patient is being managed with:   Diet DASH/TLC-  Sodium & Cholesterol Restricted  Entered: Dec  6 2022  8:58AM

## 2022-12-09 ENCOUNTER — TRANSCRIPTION ENCOUNTER (OUTPATIENT)
Age: 87
End: 2022-12-09

## 2022-12-09 VITALS
DIASTOLIC BLOOD PRESSURE: 62 MMHG | HEART RATE: 81 BPM | RESPIRATION RATE: 18 BRPM | OXYGEN SATURATION: 95 % | SYSTOLIC BLOOD PRESSURE: 143 MMHG | TEMPERATURE: 98 F

## 2022-12-09 PROCEDURE — 99239 HOSP IP/OBS DSCHRG MGMT >30: CPT

## 2022-12-09 RX ADMIN — Medication 25 MILLIGRAM(S): at 13:21

## 2022-12-09 RX ADMIN — Medication 25 MILLIGRAM(S): at 06:16

## 2022-12-09 RX ADMIN — Medication 81 MILLIGRAM(S): at 09:26

## 2022-12-09 RX ADMIN — DORZOLAMIDE HYDROCHLORIDE TIMOLOL MALEATE 1 DROP(S): 20; 5 SOLUTION/ DROPS OPHTHALMIC at 09:26

## 2022-12-09 RX ADMIN — ENOXAPARIN SODIUM 40 MILLIGRAM(S): 100 INJECTION SUBCUTANEOUS at 09:28

## 2022-12-09 RX ADMIN — AMLODIPINE BESYLATE 2.5 MILLIGRAM(S): 2.5 TABLET ORAL at 09:26

## 2022-12-09 NOTE — DISCHARGE NOTE PROVIDER - HOSPITAL COURSE
CC: fall  HPI and Hospital Course: 89 y/o female with PMHX of HTN, HLD who presented to  after fall at home. Upon admission, patient had trauma work up which was negative for bleed/ fracture.  Labs with elevated 's and elevated trop of 197.  Patient referred for admission.  Workup here as below, for return home but with family support, niece to move in with her.     VITALS:  T(F): 98.5 (12-09-22 @ 07:52), Max: 98.5 (12-09-22 @ 07:52)  HR: 81 (12-09-22 @ 07:52) (78 - 86)  BP: 143/62 (12-09-22 @ 07:52) (113/59 - 143/62)  RR: 18 (12-09-22 @ 07:52) (18 - 18)  SpO2: 95% (12-09-22 @ 07:52) (93% - 97%)    PHYSICAL EXAM:  General: NAD, lying in bed  HEENT:  pupils equal and reactive, EOMI, no oropharyngeal lesions, erythema, exudates, oral thrush  NECK:   supple, no carotid bruits, no palpable lymph nodes, no thyromegaly  CV:  +S1, +S2, regular, no murmurs or rubs  RESP:   lungs clear to auscultation bilaterally, no wheezing, rales, rhonchi, good air entry bilaterally  BREAST:  not examined  GI:  abdomen soft, non-tender, non-distended, normal BS, no bruits, no abdominal masses, no palpable masses  RECTAL:  not examined  :  not examined  MSK:   normal muscle tone, no atrophy, no rigidity, no contractions  EXT:  no clubbing, no cyanosis, no edema, no calf pain, swelling or erythema  VASCULAR:  pulses equal and symmetric in the upper and lower extremities  NEURO:  AAOX1-2, no focal neurological deficits, follows all commands, able to move extremities spontaneously  SKIN:  no ulcers, lesions or rashes    CXR 12/5/22: No acute findings in this study or concurrent chest CT    CTH, CT cspine 12/5/22: CT HEAD: No CT evidence of acute intracranial hemorrhage. Atherosclerotic changes. If patient demonstrates persistent headaches or altered mental status, consider further evaluation via MR imaging to include DWI and ADC mapping techniques, along with gradient echo acquisition technique, provided there are no contraindications. CT CERVICAL SPINE:  No acute fracture. No AP plane subluxation. Multilevel degenerative changes, as described level by level in detail above.  If there is clinical concern for myelopathy or radiculopathy, consider further evaluation via MR imaging of the cervical spine, provided the patient has no contraindications.    CT c/a/p w iv contrast 12/5/22: No acute abnormality.    Echo 12/7/22: The left ventricle is normal in size, wall thickness, wall motion but does appear mildly hyperdynamic. Estimated left ventricularejection fraction is 70 %. Normal appearing left atrium. Normal appearing right ventricle structure and function. The aortic valve is well visualized, appears sclerotic. Valve opening seems to be normal. Mild (1+) aortic regurgitation is present. The mitral valve leaflets appear thickened. EA reversal of the mitral inflow consistent with reduced compliance of the left ventricle. Mild (1+) mitral regurgitation is present. The tricuspid valve leaflets appear mildly thickened and/or calcified, but open well. Mild (1+) tricuspid valve regurgitation is present.    #Mild Rhabdo/ Elevated Trop:   -tele  -CPK downtrended  -trop plateaud  -echo unremarkable as above, EKG non ischemic   -IVF for mild rhabdo given  -ASA, statin    #Fall:    Mechanical as per patient.  Lost balance.    Walks with cane at home.  -orthostatics neg  -imaging neg for fx/traumatic injury  -PT eval appreciated      #Leukocytosis  -afebrile  -likely stress related, downtrending  -UA neg  -CT w/o source infection    #HTN:   -Resume home meds.  BB and Norvasc.      #HLD:    -Cont statin.      #Glaucoma:    -Cont drops.      #DVT pp- SQL    #for d/c 12/9 w family, F2F    I have spent 33 min on day of d/c coordinating care.

## 2022-12-09 NOTE — DISCHARGE NOTE NURSING/CASE MANAGEMENT/SOCIAL WORK - PATIENT PORTAL LINK FT
You can access the FollowMyHealth Patient Portal offered by [facility] by registering at the following website: [email or web address]. By joining [facility]’s FollowMyHealth portal, you will also be able to view your health information using other applications (apps) compatible with our system.

## 2022-12-09 NOTE — DISCHARGE NOTE PROVIDER - DETAILS OF MALNUTRITION DIAGNOSIS/DIAGNOSES
This patient has been assessed with a concern for Malnutrition and was treated during this hospitalization for the following Nutrition diagnosis/diagnoses:     -  12/08/2022: Severe protein-calorie malnutrition

## 2022-12-09 NOTE — DISCHARGE NOTE PROVIDER - NSDCMRMEDTOKEN_GEN_ALL_CORE_FT
amLODIPine 2.5 mg oral tablet: 1 tab(s) orally once a day  ***per DrFirst***  atorvastatin 10 mg oral tablet: 1 tab(s) orally once a day  ***per DrFirst***  dorzolamide-timolol 2.23%-0.68% ophthalmic solution: 1 drop(s) in each eye 2 times a day  ***DrFirst***  latanoprost 0.005% ophthalmic solution: 1 drop(s) in each eye once a day (in the evening)  ***DrFirst***  metoprolol tartrate 25 mg oral tablet: 1 tab(s) orally 3 times a day  ***per DrFirst***

## 2022-12-09 NOTE — DISCHARGE NOTE PROVIDER - CARE PROVIDER_API CALL
Bjorn Rooney (MD)  Cardiovascular Disease  [address] [place] [address], [place] [address]  Phone: [phone]  Fax: [phone]  Follow Up Time: 1 week

## 2022-12-09 NOTE — DISCHARGE NOTE PROVIDER - NSDCCPCAREPLAN_GEN_ALL_CORE_FT
PRINCIPAL DISCHARGE DIAGNOSIS  Diagnosis: Fall  Assessment and Plan of Treatment: Work up here negative, follow up with your primary care doctor.

## 2022-12-15 NOTE — CDI QUERY NOTE - NSCDIOTHERTXTBX_GEN_ALL_CORE_HH
Please clarify the type of rhabdomyolysis?  A) Traumatic rhabdomyolysis  B) Rhabdomyolysis is not due to trauma  C) Unable to determine  D) Other ( Please specify)    CLINICAL INDICATORS:    Creatine Kinase, Serum: 705 U/L (12.07.22 @ 06:37)   Creatine Kinase, Serum: 1202 U/L (12.06.22 @ 17:28)   Creatine Kinase, Serum: 527 U/L (12.05.22 @ 19:05)     H&P documentation on 12/6/2022:  8 y/o female with PMHX of HTN, HLD who presented to  after fall at home.  Patient was at home and fell backwards hitting her head.  Patient states it was a mechanical fall-- no syncope.  She states she was standing at the foot of her bed and lost her balance.  No LOC.  She hit her head on the bed when she fell.  After the fall, patient was unable to get up off the floor and was lying on floor for 24 hours.  This was per ER notes.  At present, patient doesn't recall how long she was on the ground and how she got the hospital.  Upon admission, patient had trauma work up which was negative for bleed/ fracture.  Labs with elevated 's and elevated trop of 197.  Patient referred for admission.  Patient denies cardiac history or CP in last 2 days at home.  No complaints.      Discharge documentation on 12/9/2022:  #Mild Rhabdo/ Elevated Trop:   -tele  -CPK downtrended  -trop plateaud  -echo unremarkable as above, EKG non ischemic   -IVF for mild rhabdo given  -ASA, statin

## 2022-12-23 DIAGNOSIS — D72.829 ELEVATED WHITE BLOOD CELL COUNT, UNSPECIFIED: ICD-10-CM
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
## 2022-12-23 DIAGNOSIS — E43 UNSPECIFIED SEVERE PROTEIN-CALORIE MALNUTRITION: ICD-10-CM

## 2022-12-23 DIAGNOSIS — H40.9 UNSPECIFIED GLAUCOMA: ICD-10-CM

## 2022-12-23 DIAGNOSIS — R77.8 OTHER SPECIFIED ABNORMALITIES OF PLASMA PROTEINS: ICD-10-CM

## 2022-12-23 DIAGNOSIS — Z79.899 OTHER LONG TERM (CURRENT) DRUG THERAPY: ICD-10-CM

## 2022-12-23 DIAGNOSIS — E78.5 HYPERLIPIDEMIA, UNSPECIFIED: ICD-10-CM

## 2022-12-23 DIAGNOSIS — I10 ESSENTIAL (PRIMARY) HYPERTENSION: ICD-10-CM

## 2022-12-23 DIAGNOSIS — T79.6XXA TRAUMATIC ISCHEMIA OF MUSCLE, INITIAL ENCOUNTER: ICD-10-CM

## 2023-06-21 NOTE — PHYSICAL THERAPY INITIAL EVALUATION ADULT - REFERRING PHYSICIAN, REHAB EVAL
Leela Tom Griseofulvin Counseling:  I discussed with the patient the risks of griseofulvin including but not limited to photosensitivity, cytopenia, liver damage, nausea/vomiting and severe allergy.  The patient understands that this medication is best absorbed when taken with a fatty meal (e.g., ice cream or french fries).

## 2023-12-08 NOTE — PATIENT PROFILE ADULT - NSTRANSFEREYEGLASSESPAIRS_GEN_A_NUR
INTERNAL MEDICINE URGENT VISIT NOTE    CHIEF COMPLAINT     Chief Complaint   Patient presents with    Foreign Body in Nose     right       HPI     Marina Esposito is a 78 y.o. female who presents for an urgent visit today.    PCP is Erika Rios MD, patient is known to me.     Patient presents with complaints of palpable nodule in the right nostril. She reports that she just noticed this about 2 weeks ago. Not painful. No bleeding. She denies history of similar lesion. Has seen ENT in the past for sinus issues but never for this.   Has some congestion, and sinus pressure for the past month that she is managing with astelin and allegra.     Past Medical History:  Past Medical History:   Diagnosis Date    Allergy     Arthritis     Cervical disc disease     Chronic fatigue     neg overnight puse ox    Chronic headache     Depression     Hyperlipidemia     Hypertension     Intermittent palpitations     Leg injury     history of s/p hyperbaric treatments    Low iron stores 9/19/2018    Macular degeneration     Mood swings     URI (upper respiratory infection) 5/14/2014       Home Medications:  Prior to Admission medications    Medication Sig Start Date End Date Taking? Authorizing Provider   azelastine (ASTELIN) 137 mcg (0.1 %) nasal spray 2 sprays (274 mcg total) by Nasal route 2 (two) times daily. 7/18/23 7/17/24 Yes Erika Rios MD   b complex vitamins tablet Take 1 tablet by mouth once daily.   Yes Provider, Historical   blood pressure test kit-large Kit 1 kit by Misc.(Non-Drug; Combo Route) route once daily. 10/4/18  Yes Alicia Mclaughlin MD   CALCIUM CARBONATE/VITAMIN D3 (VITAMIN D-3 ORAL) Take by mouth. 1  By mouth Every day   Yes Provider, Historical   celecoxib (CELEBREX) 100 MG capsule Take 1 capsule (100 mg total) by mouth 2 (two) times daily as needed for Pain. 7/18/23  Yes Erika Rios MD   cholecalciferol, vitamin D3, (VITAMIN D3) 250 mcg (10,000 unit) Cap capsule Take by mouth once  1 pair daily.   Yes Provider, Historical   coQ10, ubiquinol, 200 mg Cap Take 1 capsule by mouth once daily.   Yes Provider, Historical   cyanocobalamin (VITAMIN B-12) 1000 MCG tablet Take 1 tablet (1,000 mcg total) by mouth once daily. 9/21/20  Yes Dona Eubanks PA-C   denosumab (PROLIA) 60 mg/mL Syrg Inject 1 mL (60 mg total) into the skin every 6 (six) months. 10/17/23 10/16/24 Yes Erika Rios MD   fexofenadine (ALLEGRA) 180 MG tablet Take 180 mg by mouth once daily.   Yes Provider, Historical   fish oil-omega-3 fatty acids 300-1,000 mg capsule Take by mouth once daily.   Yes Provider, Historical   fluticasone (VERAMYST) 27.5 mcg/actuation nasal spray ONE SPRAY IN EACH NOSTRIL DAILY AS NEEDED FOR RHINITIS 6/13/14  Yes Tamika Novak MD   gabapentin (NEURONTIN) 300 MG capsule Take 1 capsule (300 mg total) by mouth every evening. 7/18/23  Yes Erika Rios MD   levOCARNitine tartrate (CARNITOR) 250 mg Cap Take 500 mg by mouth 3 (three) times daily.   Yes Provider, Historical   losartan (COZAAR) 50 MG tablet TAKE 1 TABLET(50 MG) BY MOUTH EVERY DAY 9/24/23  Yes Erika Rois MD   MULTIVITAMIN ORAL Take by mouth. 1  By mouth Every day   Yes Provider, Historical   sertraline (ZOLOFT) 100 MG tablet Take 2 tablets (200 mg total) by mouth once daily. 6/19/23 6/18/24 Yes Erika Rios MD   vit A/vit C/vit E/zinc/copper (ICAPS AREDS ORAL) Take 1 capsule by mouth 2 (two) times daily.   Yes Provider, Historical       Review of Systems:  Review of Systems   Constitutional:  Negative for chills and fever.   HENT:  Positive for congestion and sinus pressure. Negative for sore throat and trouble swallowing.         Growth in nostril    Eyes:  Negative for visual disturbance.   Respiratory:  Negative for cough and shortness of breath.    Cardiovascular:  Negative for chest pain.   Gastrointestinal:  Negative for abdominal pain, constipation, diarrhea, nausea and vomiting.   Genitourinary:  Negative for  "dysuria and flank pain.   Musculoskeletal:  Negative for back pain, neck pain and neck stiffness.   Skin:  Negative for rash.   Neurological:  Negative for dizziness, syncope, weakness and headaches.   Psychiatric/Behavioral:  Negative for confusion.        Health Maintainence:   Immunizations:  Health Maintenance         Date Due Completion Date    RSV Vaccine (Age 60+ and Pregnant patients) (1 - 1-dose 60+ series) Never done ---    COVID-19 Vaccine (6 - 2023-24 season) 09/01/2023 11/10/2022    Lipid Panel 04/17/2024 4/17/2023    DEXA Scan 09/21/2026 9/21/2023    Override on 5/11/2018: Done ([facility])    Override on 5/14/2014: Not Clinically Appropriate    TETANUS VACCINE 10/04/2028 10/4/2018             PHYSICAL EXAM     /64 (BP Location: Left arm, Patient Position: Sitting, BP Method: Large (Manual))   Pulse 72   Ht 5' 5" (1.651 m)   Wt 78 kg (171 lb 15.3 oz)   SpO2 98%   BMI 28.62 kg/m²     Physical Exam  Vitals and nursing note reviewed.   Constitutional:       Appearance: Normal appearance.      Comments: Healthy appearing female in NAD or apparent pain. She makes good eye contact, speaks in clear full sentences and ambulates with ease.        HENT:      Head: Normocephalic and atraumatic.      Nose: Nose normal.      Comments: Flesh colored slightly pedunculated skin growth aprox 2mm in the right nostril - near septum. No bleeding, TTP or edema.   No other lesions appreciated.      Mouth/Throat:      Pharynx: Oropharynx is clear.   Eyes:      Conjunctiva/sclera: Conjunctivae normal.   Cardiovascular:      Rate and Rhythm: Normal rate and regular rhythm.      Pulses: Normal pulses.   Pulmonary:      Effort: No respiratory distress.   Abdominal:      Tenderness: There is no abdominal tenderness.   Musculoskeletal:         General: Normal range of motion.      Cervical back: No rigidity.   Skin:     General: Skin is warm and dry.      Capillary Refill: Capillary refill takes less than 2 seconds.      " Findings: No rash.   Neurological:      General: No focal deficit present.      Mental Status: She is alert.      Gait: Gait normal.   Psychiatric:         Mood and Affect: Mood normal.         LABS     Lab Results   Component Value Date    HGBA1C 4.9 04/17/2023     CMP  Sodium   Date Value Ref Range Status   04/17/2023 142 136 - 145 mmol/L Final     Potassium   Date Value Ref Range Status   04/17/2023 4.9 3.5 - 5.1 mmol/L Final     Chloride   Date Value Ref Range Status   04/17/2023 106 95 - 110 mmol/L Final     CO2   Date Value Ref Range Status   04/17/2023 26 23 - 29 mmol/L Final     Glucose   Date Value Ref Range Status   04/17/2023 92 70 - 110 mg/dL Final     BUN   Date Value Ref Range Status   04/17/2023 24 (H) 8 - 23 mg/dL Final     Creatinine   Date Value Ref Range Status   04/17/2023 0.8 0.5 - 1.4 mg/dL Final     Calcium   Date Value Ref Range Status   04/17/2023 9.8 8.7 - 10.5 mg/dL Final     Total Protein   Date Value Ref Range Status   04/17/2023 6.9 6.0 - 8.4 g/dL Final     Albumin   Date Value Ref Range Status   04/17/2023 4.1 3.5 - 5.2 g/dL Final     Total Bilirubin   Date Value Ref Range Status   04/17/2023 0.5 0.1 - 1.0 mg/dL Final     Comment:     For infants and newborns, interpretation of results should be based  on gestational age, weight and in agreement with clinical  observations.    Premature Infant recommended reference ranges:  Up to 24 hours.............<8.0 mg/dL  Up to 48 hours............<12.0 mg/dL  3-5 days..................<15.0 mg/dL  6-29 days.................<15.0 mg/dL       Alkaline Phosphatase   Date Value Ref Range Status   04/17/2023 70 55 - 135 U/L Final     AST   Date Value Ref Range Status   04/17/2023 14 10 - 40 U/L Final     ALT   Date Value Ref Range Status   04/17/2023 14 10 - 44 U/L Final     Anion Gap   Date Value Ref Range Status   04/17/2023 10 8 - 16 mmol/L Final     eGFR if    Date Value Ref Range Status   04/08/2022 >60 >60 mL/min/1.73 m^2 Final      eGFR if non    Date Value Ref Range Status   04/08/2022 >60 >60 mL/min/1.73 m^2 Final     Comment:     Calculation used to obtain the estimated glomerular filtration  rate (eGFR) is the CKD-EPI equation.        Lab Results   Component Value Date    WBC 4.74 04/19/2023    HGB 13.3 04/19/2023    HCT 42.3 04/19/2023    MCV 86 04/19/2023     04/19/2023     Lab Results   Component Value Date    CHOL 192 04/17/2023    CHOL 194 04/08/2022    CHOL 235 (H) 07/11/2020     Lab Results   Component Value Date    HDL 60 04/17/2023    HDL 66 04/08/2022    HDL 60 07/11/2020     Lab Results   Component Value Date    LDLCALC 115.8 04/17/2023    LDLCALC 114.4 04/08/2022    LDLCALC 153.4 07/11/2020     Lab Results   Component Value Date    TRIG 81 04/17/2023    TRIG 68 04/08/2022    TRIG 108 07/11/2020     Lab Results   Component Value Date    CHOLHDL 31.3 04/17/2023    CHOLHDL 34.0 04/08/2022    CHOLHDL 25.5 07/11/2020     Lab Results   Component Value Date    TSH 1.138 04/17/2023       ASSESSMENT/PLAN     Marina Esposito is a 78 y.o. female     Marina was seen today for foreign body in nose.    Diagnoses and all orders for this visit:    Lesion of nostril  -     Ambulatory referral/consult to ENT; Future      Patient education provided from [place]. Patient was counseled on when and how to seek emergent care.       Giselle Lama PA-C   Department of Internal Medicine - [facility]   12:12 PM

## 2024-01-01 ENCOUNTER — INPATIENT (INPATIENT)
Facility: HOSPITAL | Age: 89
LOS: 1 days | DRG: 684 | End: 2024-01-12
Attending: STUDENT IN AN ORGANIZED HEALTH CARE EDUCATION/TRAINING PROGRAM | Admitting: STUDENT IN AN ORGANIZED HEALTH CARE EDUCATION/TRAINING PROGRAM
Payer: MEDICARE

## 2024-01-01 VITALS
HEIGHT: 60 IN | DIASTOLIC BLOOD PRESSURE: 90 MMHG | SYSTOLIC BLOOD PRESSURE: 149 MMHG | HEART RATE: 62 BPM | WEIGHT: 110.01 LBS | TEMPERATURE: 98 F | RESPIRATION RATE: 17 BRPM | OXYGEN SATURATION: 100 %

## 2024-01-01 VITALS
DIASTOLIC BLOOD PRESSURE: 69 MMHG | OXYGEN SATURATION: 96 % | RESPIRATION RATE: 18 BRPM | HEART RATE: 60 BPM | TEMPERATURE: 98 F | SYSTOLIC BLOOD PRESSURE: 147 MMHG

## 2024-01-01 DIAGNOSIS — R26.81 UNSTEADINESS ON FEET: ICD-10-CM

## 2024-01-01 LAB
ALBUMIN SERPL ELPH-MCNC: 3.7 G/DL — SIGNIFICANT CHANGE UP (ref 3.3–5)
ALBUMIN SERPL ELPH-MCNC: 3.7 G/DL — SIGNIFICANT CHANGE UP (ref 3.3–5)
ALP SERPL-CCNC: 106 U/L — SIGNIFICANT CHANGE UP (ref 40–120)
ALP SERPL-CCNC: 106 U/L — SIGNIFICANT CHANGE UP (ref 40–120)
ALT FLD-CCNC: 24 U/L — SIGNIFICANT CHANGE UP (ref 12–78)
ALT FLD-CCNC: 24 U/L — SIGNIFICANT CHANGE UP (ref 12–78)
ANION GAP SERPL CALC-SCNC: 13 MMOL/L — SIGNIFICANT CHANGE UP (ref 5–17)
ANION GAP SERPL CALC-SCNC: 13 MMOL/L — SIGNIFICANT CHANGE UP (ref 5–17)
ANION GAP SERPL CALC-SCNC: 8 MMOL/L — SIGNIFICANT CHANGE UP (ref 5–17)
ANION GAP SERPL CALC-SCNC: 8 MMOL/L — SIGNIFICANT CHANGE UP (ref 5–17)
ANION GAP SERPL CALC-SCNC: 9 MMOL/L — SIGNIFICANT CHANGE UP (ref 5–17)
ANION GAP SERPL CALC-SCNC: 9 MMOL/L — SIGNIFICANT CHANGE UP (ref 5–17)
APPEARANCE UR: CLEAR — SIGNIFICANT CHANGE UP
APPEARANCE UR: CLEAR — SIGNIFICANT CHANGE UP
AST SERPL-CCNC: 32 U/L — SIGNIFICANT CHANGE UP (ref 15–37)
AST SERPL-CCNC: 32 U/L — SIGNIFICANT CHANGE UP (ref 15–37)
BACTERIA # UR AUTO: NEGATIVE /HPF — SIGNIFICANT CHANGE UP
BACTERIA # UR AUTO: NEGATIVE /HPF — SIGNIFICANT CHANGE UP
BASOPHILS # BLD AUTO: 0.04 K/UL — SIGNIFICANT CHANGE UP (ref 0–0.2)
BASOPHILS # BLD AUTO: 0.04 K/UL — SIGNIFICANT CHANGE UP (ref 0–0.2)
BASOPHILS NFR BLD AUTO: 0.2 % — SIGNIFICANT CHANGE UP (ref 0–2)
BASOPHILS NFR BLD AUTO: 0.2 % — SIGNIFICANT CHANGE UP (ref 0–2)
BILIRUB SERPL-MCNC: 0.8 MG/DL — SIGNIFICANT CHANGE UP (ref 0.2–1.2)
BILIRUB SERPL-MCNC: 0.8 MG/DL — SIGNIFICANT CHANGE UP (ref 0.2–1.2)
BILIRUB UR-MCNC: NEGATIVE — SIGNIFICANT CHANGE UP
BILIRUB UR-MCNC: NEGATIVE — SIGNIFICANT CHANGE UP
BUN SERPL-MCNC: 34 MG/DL — HIGH (ref 7–23)
BUN SERPL-MCNC: 34 MG/DL — HIGH (ref 7–23)
BUN SERPL-MCNC: 37 MG/DL — HIGH (ref 7–23)
CALCIUM SERPL-MCNC: 9.1 MG/DL — SIGNIFICANT CHANGE UP (ref 8.5–10.1)
CALCIUM SERPL-MCNC: 9.1 MG/DL — SIGNIFICANT CHANGE UP (ref 8.5–10.1)
CALCIUM SERPL-MCNC: 9.2 MG/DL — SIGNIFICANT CHANGE UP (ref 8.5–10.1)
CALCIUM SERPL-MCNC: 9.2 MG/DL — SIGNIFICANT CHANGE UP (ref 8.5–10.1)
CALCIUM SERPL-MCNC: 9.5 MG/DL — SIGNIFICANT CHANGE UP (ref 8.5–10.1)
CALCIUM SERPL-MCNC: 9.5 MG/DL — SIGNIFICANT CHANGE UP (ref 8.5–10.1)
CHLORIDE SERPL-SCNC: 102 MMOL/L — SIGNIFICANT CHANGE UP (ref 96–108)
CHLORIDE SERPL-SCNC: 102 MMOL/L — SIGNIFICANT CHANGE UP (ref 96–108)
CHLORIDE SERPL-SCNC: 105 MMOL/L — SIGNIFICANT CHANGE UP (ref 96–108)
CHLORIDE SERPL-SCNC: 105 MMOL/L — SIGNIFICANT CHANGE UP (ref 96–108)
CHLORIDE SERPL-SCNC: 107 MMOL/L — SIGNIFICANT CHANGE UP (ref 96–108)
CHLORIDE SERPL-SCNC: 107 MMOL/L — SIGNIFICANT CHANGE UP (ref 96–108)
CO2 SERPL-SCNC: 20 MMOL/L — LOW (ref 22–31)
CO2 SERPL-SCNC: 20 MMOL/L — LOW (ref 22–31)
CO2 SERPL-SCNC: 23 MMOL/L — SIGNIFICANT CHANGE UP (ref 22–31)
CO2 SERPL-SCNC: 23 MMOL/L — SIGNIFICANT CHANGE UP (ref 22–31)
CO2 SERPL-SCNC: 24 MMOL/L — SIGNIFICANT CHANGE UP (ref 22–31)
CO2 SERPL-SCNC: 24 MMOL/L — SIGNIFICANT CHANGE UP (ref 22–31)
COLOR SPEC: YELLOW — SIGNIFICANT CHANGE UP
COLOR SPEC: YELLOW — SIGNIFICANT CHANGE UP
CREAT SERPL-MCNC: 1.02 MG/DL — SIGNIFICANT CHANGE UP (ref 0.5–1.3)
CREAT SERPL-MCNC: 1.02 MG/DL — SIGNIFICANT CHANGE UP (ref 0.5–1.3)
CREAT SERPL-MCNC: 1.25 MG/DL — SIGNIFICANT CHANGE UP (ref 0.5–1.3)
CREAT SERPL-MCNC: 1.25 MG/DL — SIGNIFICANT CHANGE UP (ref 0.5–1.3)
CREAT SERPL-MCNC: 1.56 MG/DL — HIGH (ref 0.5–1.3)
CREAT SERPL-MCNC: 1.56 MG/DL — HIGH (ref 0.5–1.3)
CULTURE RESULTS: SIGNIFICANT CHANGE UP
CULTURE RESULTS: SIGNIFICANT CHANGE UP
DIFF PNL FLD: ABNORMAL
DIFF PNL FLD: ABNORMAL
EGFR: 30 ML/MIN/1.73M2 — LOW
EGFR: 30 ML/MIN/1.73M2 — LOW
EGFR: 39 ML/MIN/1.73M2 — LOW
EGFR: 39 ML/MIN/1.73M2 — LOW
EGFR: 50 ML/MIN/1.73M2 — LOW
EGFR: 50 ML/MIN/1.73M2 — LOW
EOSINOPHIL # BLD AUTO: 0 K/UL — SIGNIFICANT CHANGE UP (ref 0–0.5)
EOSINOPHIL # BLD AUTO: 0 K/UL — SIGNIFICANT CHANGE UP (ref 0–0.5)
EOSINOPHIL NFR BLD AUTO: 0 % — SIGNIFICANT CHANGE UP (ref 0–6)
EOSINOPHIL NFR BLD AUTO: 0 % — SIGNIFICANT CHANGE UP (ref 0–6)
FLUAV AG NPH QL: SIGNIFICANT CHANGE UP
FLUAV AG NPH QL: SIGNIFICANT CHANGE UP
FLUBV AG NPH QL: SIGNIFICANT CHANGE UP
FLUBV AG NPH QL: SIGNIFICANT CHANGE UP
GLUCOSE SERPL-MCNC: 158 MG/DL — HIGH (ref 70–99)
GLUCOSE SERPL-MCNC: 158 MG/DL — HIGH (ref 70–99)
GLUCOSE SERPL-MCNC: 84 MG/DL — SIGNIFICANT CHANGE UP (ref 70–99)
GLUCOSE SERPL-MCNC: 84 MG/DL — SIGNIFICANT CHANGE UP (ref 70–99)
GLUCOSE SERPL-MCNC: 86 MG/DL — SIGNIFICANT CHANGE UP (ref 70–99)
GLUCOSE SERPL-MCNC: 86 MG/DL — SIGNIFICANT CHANGE UP (ref 70–99)
GLUCOSE UR QL: 100 MG/DL
GLUCOSE UR QL: 100 MG/DL
HCT VFR BLD CALC: 35.6 % — SIGNIFICANT CHANGE UP (ref 34.5–45)
HCT VFR BLD CALC: 35.6 % — SIGNIFICANT CHANGE UP (ref 34.5–45)
HCT VFR BLD CALC: 36.6 % — SIGNIFICANT CHANGE UP (ref 34.5–45)
HCT VFR BLD CALC: 36.6 % — SIGNIFICANT CHANGE UP (ref 34.5–45)
HCT VFR BLD CALC: 37.5 % — SIGNIFICANT CHANGE UP (ref 34.5–45)
HCT VFR BLD CALC: 37.5 % — SIGNIFICANT CHANGE UP (ref 34.5–45)
HGB BLD-MCNC: 11.6 G/DL — SIGNIFICANT CHANGE UP (ref 11.5–15.5)
HGB BLD-MCNC: 11.6 G/DL — SIGNIFICANT CHANGE UP (ref 11.5–15.5)
HGB BLD-MCNC: 12 G/DL — SIGNIFICANT CHANGE UP (ref 11.5–15.5)
HGB BLD-MCNC: 12 G/DL — SIGNIFICANT CHANGE UP (ref 11.5–15.5)
HGB BLD-MCNC: 12.2 G/DL — SIGNIFICANT CHANGE UP (ref 11.5–15.5)
HGB BLD-MCNC: 12.2 G/DL — SIGNIFICANT CHANGE UP (ref 11.5–15.5)
IMM GRANULOCYTES NFR BLD AUTO: 0.7 % — SIGNIFICANT CHANGE UP (ref 0–0.9)
IMM GRANULOCYTES NFR BLD AUTO: 0.7 % — SIGNIFICANT CHANGE UP (ref 0–0.9)
KETONES UR-MCNC: 15 MG/DL
KETONES UR-MCNC: 15 MG/DL
LACTATE SERPL-SCNC: 1.7 MMOL/L — SIGNIFICANT CHANGE UP (ref 0.7–2)
LACTATE SERPL-SCNC: 1.7 MMOL/L — SIGNIFICANT CHANGE UP (ref 0.7–2)
LEUKOCYTE ESTERASE UR-ACNC: NEGATIVE — SIGNIFICANT CHANGE UP
LEUKOCYTE ESTERASE UR-ACNC: NEGATIVE — SIGNIFICANT CHANGE UP
LYMPHOCYTES # BLD AUTO: 0.65 K/UL — LOW (ref 1–3.3)
LYMPHOCYTES # BLD AUTO: 0.65 K/UL — LOW (ref 1–3.3)
LYMPHOCYTES # BLD AUTO: 3.7 % — LOW (ref 13–44)
LYMPHOCYTES # BLD AUTO: 3.7 % — LOW (ref 13–44)
MAGNESIUM SERPL-MCNC: 2.2 MG/DL — SIGNIFICANT CHANGE UP (ref 1.6–2.6)
MAGNESIUM SERPL-MCNC: 2.2 MG/DL — SIGNIFICANT CHANGE UP (ref 1.6–2.6)
MCHC RBC-ENTMCNC: 30.7 PG — SIGNIFICANT CHANGE UP (ref 27–34)
MCHC RBC-ENTMCNC: 30.7 PG — SIGNIFICANT CHANGE UP (ref 27–34)
MCHC RBC-ENTMCNC: 30.9 PG — SIGNIFICANT CHANGE UP (ref 27–34)
MCHC RBC-ENTMCNC: 30.9 PG — SIGNIFICANT CHANGE UP (ref 27–34)
MCHC RBC-ENTMCNC: 31.2 PG — SIGNIFICANT CHANGE UP (ref 27–34)
MCHC RBC-ENTMCNC: 31.2 PG — SIGNIFICANT CHANGE UP (ref 27–34)
MCHC RBC-ENTMCNC: 32.5 GM/DL — SIGNIFICANT CHANGE UP (ref 32–36)
MCHC RBC-ENTMCNC: 32.5 GM/DL — SIGNIFICANT CHANGE UP (ref 32–36)
MCHC RBC-ENTMCNC: 32.6 GM/DL — SIGNIFICANT CHANGE UP (ref 32–36)
MCHC RBC-ENTMCNC: 32.6 GM/DL — SIGNIFICANT CHANGE UP (ref 32–36)
MCHC RBC-ENTMCNC: 32.8 GM/DL — SIGNIFICANT CHANGE UP (ref 32–36)
MCHC RBC-ENTMCNC: 32.8 GM/DL — SIGNIFICANT CHANGE UP (ref 32–36)
MCV RBC AUTO: 94.5 FL — SIGNIFICANT CHANGE UP (ref 80–100)
MCV RBC AUTO: 94.5 FL — SIGNIFICANT CHANGE UP (ref 80–100)
MCV RBC AUTO: 94.7 FL — SIGNIFICANT CHANGE UP (ref 80–100)
MCV RBC AUTO: 94.7 FL — SIGNIFICANT CHANGE UP (ref 80–100)
MCV RBC AUTO: 95.1 FL — SIGNIFICANT CHANGE UP (ref 80–100)
MCV RBC AUTO: 95.1 FL — SIGNIFICANT CHANGE UP (ref 80–100)
MONOCYTES # BLD AUTO: 0.79 K/UL — SIGNIFICANT CHANGE UP (ref 0–0.9)
MONOCYTES # BLD AUTO: 0.79 K/UL — SIGNIFICANT CHANGE UP (ref 0–0.9)
MONOCYTES NFR BLD AUTO: 4.5 % — SIGNIFICANT CHANGE UP (ref 2–14)
MONOCYTES NFR BLD AUTO: 4.5 % — SIGNIFICANT CHANGE UP (ref 2–14)
NEUTROPHILS # BLD AUTO: 16.08 K/UL — HIGH (ref 1.8–7.4)
NEUTROPHILS # BLD AUTO: 16.08 K/UL — HIGH (ref 1.8–7.4)
NEUTROPHILS NFR BLD AUTO: 90.9 % — HIGH (ref 43–77)
NEUTROPHILS NFR BLD AUTO: 90.9 % — HIGH (ref 43–77)
NITRITE UR-MCNC: NEGATIVE — SIGNIFICANT CHANGE UP
NITRITE UR-MCNC: NEGATIVE — SIGNIFICANT CHANGE UP
NT-PROBNP SERPL-SCNC: 6156 PG/ML — HIGH (ref 0–450)
NT-PROBNP SERPL-SCNC: 6156 PG/ML — HIGH (ref 0–450)
PH UR: 5 — SIGNIFICANT CHANGE UP (ref 5–8)
PH UR: 5 — SIGNIFICANT CHANGE UP (ref 5–8)
PLATELET # BLD AUTO: 224 K/UL — SIGNIFICANT CHANGE UP (ref 150–400)
PLATELET # BLD AUTO: 224 K/UL — SIGNIFICANT CHANGE UP (ref 150–400)
PLATELET # BLD AUTO: 229 K/UL — SIGNIFICANT CHANGE UP (ref 150–400)
PLATELET # BLD AUTO: 229 K/UL — SIGNIFICANT CHANGE UP (ref 150–400)
PLATELET # BLD AUTO: 234 K/UL — SIGNIFICANT CHANGE UP (ref 150–400)
PLATELET # BLD AUTO: 234 K/UL — SIGNIFICANT CHANGE UP (ref 150–400)
POTASSIUM SERPL-MCNC: 3.9 MMOL/L — SIGNIFICANT CHANGE UP (ref 3.5–5.3)
POTASSIUM SERPL-MCNC: 4.4 MMOL/L — SIGNIFICANT CHANGE UP (ref 3.5–5.3)
POTASSIUM SERPL-MCNC: 4.4 MMOL/L — SIGNIFICANT CHANGE UP (ref 3.5–5.3)
POTASSIUM SERPL-SCNC: 3.9 MMOL/L — SIGNIFICANT CHANGE UP (ref 3.5–5.3)
POTASSIUM SERPL-SCNC: 4.4 MMOL/L — SIGNIFICANT CHANGE UP (ref 3.5–5.3)
POTASSIUM SERPL-SCNC: 4.4 MMOL/L — SIGNIFICANT CHANGE UP (ref 3.5–5.3)
PROT SERPL-MCNC: 7.4 GM/DL — SIGNIFICANT CHANGE UP (ref 6–8.3)
PROT SERPL-MCNC: 7.4 GM/DL — SIGNIFICANT CHANGE UP (ref 6–8.3)
PROT UR-MCNC: 100 MG/DL
PROT UR-MCNC: 100 MG/DL
RBC # BLD: 3.76 M/UL — LOW (ref 3.8–5.2)
RBC # BLD: 3.76 M/UL — LOW (ref 3.8–5.2)
RBC # BLD: 3.85 M/UL — SIGNIFICANT CHANGE UP (ref 3.8–5.2)
RBC # BLD: 3.85 M/UL — SIGNIFICANT CHANGE UP (ref 3.8–5.2)
RBC # BLD: 3.97 M/UL — SIGNIFICANT CHANGE UP (ref 3.8–5.2)
RBC # BLD: 3.97 M/UL — SIGNIFICANT CHANGE UP (ref 3.8–5.2)
RBC # FLD: 14.3 % — SIGNIFICANT CHANGE UP (ref 10.3–14.5)
RBC # FLD: 14.5 % — SIGNIFICANT CHANGE UP (ref 10.3–14.5)
RBC # FLD: 14.5 % — SIGNIFICANT CHANGE UP (ref 10.3–14.5)
RBC CASTS # UR COMP ASSIST: 2 /HPF — SIGNIFICANT CHANGE UP (ref 0–4)
RBC CASTS # UR COMP ASSIST: 2 /HPF — SIGNIFICANT CHANGE UP (ref 0–4)
RSV RNA NPH QL NAA+NON-PROBE: SIGNIFICANT CHANGE UP
RSV RNA NPH QL NAA+NON-PROBE: SIGNIFICANT CHANGE UP
SARS-COV-2 RNA SPEC QL NAA+PROBE: SIGNIFICANT CHANGE UP
SARS-COV-2 RNA SPEC QL NAA+PROBE: SIGNIFICANT CHANGE UP
SODIUM SERPL-SCNC: 135 MMOL/L — SIGNIFICANT CHANGE UP (ref 135–145)
SODIUM SERPL-SCNC: 135 MMOL/L — SIGNIFICANT CHANGE UP (ref 135–145)
SODIUM SERPL-SCNC: 138 MMOL/L — SIGNIFICANT CHANGE UP (ref 135–145)
SP GR SPEC: 1.01 — SIGNIFICANT CHANGE UP (ref 1–1.03)
SP GR SPEC: 1.01 — SIGNIFICANT CHANGE UP (ref 1–1.03)
SPECIMEN SOURCE: SIGNIFICANT CHANGE UP
SPECIMEN SOURCE: SIGNIFICANT CHANGE UP
SQUAMOUS # UR AUTO: 3 /HPF — SIGNIFICANT CHANGE UP (ref 0–5)
SQUAMOUS # UR AUTO: 3 /HPF — SIGNIFICANT CHANGE UP (ref 0–5)
UROBILINOGEN FLD QL: 1 MG/DL — SIGNIFICANT CHANGE UP (ref 0.2–1)
UROBILINOGEN FLD QL: 1 MG/DL — SIGNIFICANT CHANGE UP (ref 0.2–1)
WBC # BLD: 12.84 K/UL — HIGH (ref 3.8–10.5)
WBC # BLD: 12.84 K/UL — HIGH (ref 3.8–10.5)
WBC # BLD: 14.52 K/UL — HIGH (ref 3.8–10.5)
WBC # BLD: 14.52 K/UL — HIGH (ref 3.8–10.5)
WBC # BLD: 17.69 K/UL — HIGH (ref 3.8–10.5)
WBC # BLD: 17.69 K/UL — HIGH (ref 3.8–10.5)
WBC # FLD AUTO: 12.84 K/UL — HIGH (ref 3.8–10.5)
WBC # FLD AUTO: 12.84 K/UL — HIGH (ref 3.8–10.5)
WBC # FLD AUTO: 14.52 K/UL — HIGH (ref 3.8–10.5)
WBC # FLD AUTO: 14.52 K/UL — HIGH (ref 3.8–10.5)
WBC # FLD AUTO: 17.69 K/UL — HIGH (ref 3.8–10.5)
WBC # FLD AUTO: 17.69 K/UL — HIGH (ref 3.8–10.5)
WBC UR QL: 4 /HPF — SIGNIFICANT CHANGE UP (ref 0–5)
WBC UR QL: 4 /HPF — SIGNIFICANT CHANGE UP (ref 0–5)

## 2024-01-01 PROCEDURE — 80048 BASIC METABOLIC PNL TOTAL CA: CPT

## 2024-01-01 PROCEDURE — 97162 PT EVAL MOD COMPLEX 30 MIN: CPT | Mod: GP

## 2024-01-01 PROCEDURE — 71045 X-RAY EXAM CHEST 1 VIEW: CPT | Mod: 26

## 2024-01-01 PROCEDURE — 73560 X-RAY EXAM OF KNEE 1 OR 2: CPT | Mod: 26,50

## 2024-01-01 PROCEDURE — 71250 CT THORAX DX C-: CPT | Mod: MA

## 2024-01-01 PROCEDURE — 74176 CT ABD & PELVIS W/O CONTRAST: CPT | Mod: 26

## 2024-01-01 PROCEDURE — 73560 X-RAY EXAM OF KNEE 1 OR 2: CPT | Mod: 50

## 2024-01-01 PROCEDURE — 85027 COMPLETE CBC AUTOMATED: CPT

## 2024-01-01 PROCEDURE — 74176 CT ABD & PELVIS W/O CONTRAST: CPT | Mod: MA

## 2024-01-01 PROCEDURE — 71250 CT THORAX DX C-: CPT | Mod: 26

## 2024-01-01 PROCEDURE — 99291 CRITICAL CARE FIRST HOUR: CPT | Mod: 25

## 2024-01-01 PROCEDURE — 70450 CT HEAD/BRAIN W/O DYE: CPT | Mod: QQ

## 2024-01-01 PROCEDURE — 97530 THERAPEUTIC ACTIVITIES: CPT | Mod: GP

## 2024-01-01 PROCEDURE — 99285 EMERGENCY DEPT VISIT HI MDM: CPT

## 2024-01-01 PROCEDURE — 99223 1ST HOSP IP/OBS HIGH 75: CPT

## 2024-01-01 PROCEDURE — 36415 COLL VENOUS BLD VENIPUNCTURE: CPT

## 2024-01-01 PROCEDURE — 92950 HEART/LUNG RESUSCITATION CPR: CPT

## 2024-01-01 PROCEDURE — 72170 X-RAY EXAM OF PELVIS: CPT | Mod: 26

## 2024-01-01 PROCEDURE — 97116 GAIT TRAINING THERAPY: CPT | Mod: GP

## 2024-01-01 PROCEDURE — 31500 INSERT EMERGENCY AIRWAY: CPT

## 2024-01-01 PROCEDURE — 93010 ELECTROCARDIOGRAM REPORT: CPT

## 2024-01-01 PROCEDURE — 70450 CT HEAD/BRAIN W/O DYE: CPT | Mod: 26

## 2024-01-01 PROCEDURE — 72110 X-RAY EXAM L-2 SPINE 4/>VWS: CPT | Mod: 26

## 2024-01-01 RX ORDER — DORZOLAMIDE HYDROCHLORIDE TIMOLOL MALEATE 20; 5 MG/ML; MG/ML
1 SOLUTION/ DROPS OPHTHALMIC
Refills: 0 | Status: DISCONTINUED | OUTPATIENT
Start: 2024-01-01 | End: 2024-01-01

## 2024-01-01 RX ORDER — ONDANSETRON 8 MG/1
4 TABLET, FILM COATED ORAL EVERY 8 HOURS
Refills: 0 | Status: DISCONTINUED | OUTPATIENT
Start: 2024-01-01 | End: 2024-01-01

## 2024-01-01 RX ORDER — DORZOLAMIDE HYDROCHLORIDE TIMOLOL MALEATE 20; 5 MG/ML; MG/ML
1 SOLUTION/ DROPS OPHTHALMIC
Refills: 0 | DISCHARGE

## 2024-01-01 RX ORDER — METOPROLOL TARTRATE 50 MG
25 TABLET ORAL THREE TIMES A DAY
Refills: 0 | Status: DISCONTINUED | OUTPATIENT
Start: 2024-01-01 | End: 2024-01-01

## 2024-01-01 RX ORDER — LATANOPROST 0.05 MG/ML
1 SOLUTION/ DROPS OPHTHALMIC; TOPICAL
Refills: 0 | DISCHARGE

## 2024-01-01 RX ORDER — LATANOPROST 0.05 MG/ML
1 SOLUTION/ DROPS OPHTHALMIC; TOPICAL AT BEDTIME
Refills: 0 | Status: DISCONTINUED | OUTPATIENT
Start: 2024-01-01 | End: 2024-01-01

## 2024-01-01 RX ORDER — SODIUM CHLORIDE 9 MG/ML
1000 INJECTION INTRAMUSCULAR; INTRAVENOUS; SUBCUTANEOUS
Refills: 0 | Status: DISCONTINUED | OUTPATIENT
Start: 2024-01-01 | End: 2024-01-01

## 2024-01-01 RX ORDER — METOPROLOL TARTRATE 50 MG
1 TABLET ORAL
Refills: 0 | DISCHARGE

## 2024-01-01 RX ORDER — LANOLIN ALCOHOL/MO/W.PET/CERES
3 CREAM (GRAM) TOPICAL AT BEDTIME
Refills: 0 | Status: DISCONTINUED | OUTPATIENT
Start: 2024-01-01 | End: 2024-01-01

## 2024-01-01 RX ORDER — ACETAMINOPHEN 500 MG
650 TABLET ORAL EVERY 6 HOURS
Refills: 0 | Status: DISCONTINUED | OUTPATIENT
Start: 2024-01-01 | End: 2024-01-01

## 2024-01-01 RX ORDER — ATORVASTATIN CALCIUM 80 MG/1
1 TABLET, FILM COATED ORAL
Qty: 0 | Refills: 0 | DISCHARGE

## 2024-01-01 RX ORDER — SODIUM CHLORIDE 9 MG/ML
1000 INJECTION INTRAMUSCULAR; INTRAVENOUS; SUBCUTANEOUS ONCE
Refills: 0 | Status: COMPLETED | OUTPATIENT
Start: 2024-01-01 | End: 2024-01-01

## 2024-01-01 RX ORDER — HYDRALAZINE HCL 50 MG
5 TABLET ORAL ONCE
Refills: 0 | Status: COMPLETED | OUTPATIENT
Start: 2024-01-01 | End: 2024-01-01

## 2024-01-01 RX ORDER — AMLODIPINE BESYLATE 2.5 MG/1
1 TABLET ORAL
Qty: 0 | Refills: 0 | DISCHARGE

## 2024-01-01 RX ADMIN — DORZOLAMIDE HYDROCHLORIDE TIMOLOL MALEATE 1 DROP(S): 20; 5 SOLUTION/ DROPS OPHTHALMIC at 09:16

## 2024-01-01 RX ADMIN — DORZOLAMIDE HYDROCHLORIDE TIMOLOL MALEATE 1 DROP(S): 20; 5 SOLUTION/ DROPS OPHTHALMIC at 21:07

## 2024-01-01 RX ADMIN — Medication 5 MILLIGRAM(S): at 06:33

## 2024-01-01 RX ADMIN — Medication 25 MILLIGRAM(S): at 16:18

## 2024-01-01 RX ADMIN — SODIUM CHLORIDE 1000 MILLILITER(S): 9 INJECTION INTRAMUSCULAR; INTRAVENOUS; SUBCUTANEOUS at 21:00

## 2024-01-01 RX ADMIN — SODIUM CHLORIDE 100 MILLILITER(S): 9 INJECTION INTRAMUSCULAR; INTRAVENOUS; SUBCUTANEOUS at 05:42

## 2024-01-01 RX ADMIN — LATANOPROST 1 DROP(S): 0.05 SOLUTION/ DROPS OPHTHALMIC; TOPICAL at 21:07

## 2024-01-01 RX ADMIN — DORZOLAMIDE HYDROCHLORIDE TIMOLOL MALEATE 1 DROP(S): 20; 5 SOLUTION/ DROPS OPHTHALMIC at 09:57

## 2024-01-10 NOTE — ED ADULT TRIAGE NOTE - NS ED NURSE AMBULANCES
[facility]

## 2024-01-10 NOTE — ED ADULT TRIAGE NOTE - PAIN: [facility], ML[facility]
Fax to [phone]    Electronically Signed by:    Jenni Rogers CMA , 5/3/2022       denies pain/discomfort (Rating = 0)

## 2024-01-10 NOTE — ED ADULT NURSE NOTE - NSFALLHARMRISKINTERV_ED_ALL_ED
Assistance OOB with selected safe patient handling equipment if applicable/Assistance with ambulation/Communicate risk of Fall with Harm to all staff, patient, and family/Provide visual cue: red socks, yellow wristband, yellow gown, etc/Reinforce activity limits and safety measures with patient and family/Bed in lowest position, wheels locked, appropriate side rails in place/Call bell, personal items and telephone in reach/Instruct patient to call for assistance before getting out of bed/chair/stretcher/Non-slip footwear applied when patient is off stretcher/[place] to call system/Physically safe environment - no spills, clutter or unnecessary equipment/Purposeful Proactive Rounding/Room/bathroom lighting operational, light cord in reach Assistance OOB with selected safe patient handling equipment if applicable/Assistance with ambulation/Communicate risk of Fall with Harm to all staff, patient, and family/Provide visual cue: red socks, yellow wristband, yellow gown, etc/Reinforce activity limits and safety measures with patient and family/Bed in lowest position, wheels locked, appropriate side rails in place/Call bell, personal items and telephone in reach/Instruct patient to call for assistance before getting out of bed/chair/stretcher/Non-slip footwear applied when patient is off stretcher/[place] to call system/Physically safe environment - no spills, clutter or unnecessary equipment/Purposeful Proactive Rounding/Room/bathroom lighting operational, light cord in reach

## 2024-01-10 NOTE — ED ADULT TRIAGE NOTE - CHIEF COMPLAINT QUOTE
Pt. BIBEMS from home c/o increasing weakness/lethargy today. Pt. As per EMS pt. lives with her niece and niece noticed her having increasing weakness and lethargy today. Pt. currently has no complaints, oriented to self and place.  in triage. Pt. taken for EKG. fall precautions initiated

## 2024-01-10 NOTE — ED PROVIDER NOTE - OBJECTIVE STATEMENT
89 y/o F with PMHx of HTN, HLD presents to the ED BIBEMS from home c/o increasing weakness/lethargy and decreased PO intake today. Pt was unable to get out of bed today. As per EMS, pt lives with niece, and niece started to notice increased weakness and lethargy today. Pt denies cp, SOB, abd pain, cough, fevers, dysuria. Niece says this has been going on for 3x days.

## 2024-01-10 NOTE — ED ADULT NURSE NOTE - OBJECTIVE STATEMENT
pt a&o x3 biba for new onset lethargy and weakness. pt states she has been increasingly tired. denies fevers, pain. pt poor historian

## 2024-01-10 NOTE — ED ADULT TRIAGE NOTE - BANDS:
What Type Of Note Output Would You Prefer (Optional)?: Standard Output Hpi Title: Evaluation of Skin Lesions How Severe Are Your Spot(S)?: mild Have Your Spot(S) Been Treated In The Past?: has not been treated Additional History: a temp. 06.5 Fall Risk;

## 2024-01-10 NOTE — ED ADULT NURSE NOTE - NSFALLRISK_ED_ALL_ED
7/12/2023       RE: Jennifer Jane  [address]  [facility] [address]     Dear Colleague,    Thank you for referring your patient, Jennifer Jane, to the [facility] [place] at [facility]. Please see a copy of my visit note below.    Endocrine Consult note    Attending Assessment/Plan :     Hypercalcemia with high PTH and borderline hypercalcuria .  The pattern is that of mild primary hyperparathyroidism.    Indications for surgical treatment of hyperparathyroidism include the following:   Calcium > 1 mg/dl over normal not met   Kidney stones- not met   Osteoporosis- not met   Hypercalciuria -  met   Declining renal function    We have not clearly localized parathyroid adenoma - conflicting results on US and parathyroid scan.    Repeat parathyroid US didn't resolve the issue    Labs in November   ?referral to parathyroid surgeon-- check results and then decide     Hypercalciuria - as above.     Low bone density. The FRAX score at the hip > 3% would lead to recommendation for treatment.   Her sister recently fractured hehr hip  Next DXA 2 year after the last one     start alendronate.    Post menopausal     Prediabetes       Indigo Eid MD    Chief complaint: HISTORY OF PRESENT ILLNESS    Jennifer presents to follow up on primary hyperparathyroidism, low bone density.  I have seen her once before, on 10/26/22.  Since then, she has had a number of labs and images as noted.    Hypercalcemia has been intermittently noted since 11/27/19.  She was on hydrochlorothiazide since at least 2017 until 11/27/19.  Despite this, the urine calcium is high.  We saw a parathyroid candidate on the RIGHT by US.  By parathyroid scan the parathyroid candidate is on the LEFT.       Endocrine relevant labs are as follows:  11/27/19 Ca 10.5, creatinine 0.86  2/16/22 HgbA1c 5.8%  9/16/22 Ca 10.4, TSH 1.61  9/26/22 iCa 5.3, PTH 68, 25OHD 25  10/4/22 Ca  Yes 10.4, Mg 2, alk phos 87, creatinine 0.81, TSH 1.41; 24 hour urine calcium 250 mg/24hours,   10/5/2022 iCa   2/17/23 Ca 10.5, Hgba1c 6.3%, testosterone < 2, Dheas < 15, 25OHD 17, TSH 1.36,  Alk phos 79  5/15/2023 Ca 10.4, phos 3.3 , PTH 58 ,  creatinine 0.78  5/16/23 244 hour urine calcium 260 mg/24 hours, 7.4 mg/dl, urine creatinine 1.06 g/spec; 30.7 mg/dl, volume 3450.   Urine calcium/creatinine clearance ratio 0.019    Drink a ton of mmilk, yogurt, ice cream  Takes tums for heartburn--    Relevant imaging is as follows: (as read by me as it pertains to endocrine relevant organs)  11/17/2021 MRI brain: there are no T1 coronal pituitary images.  Pituitary appears normal on sagittal;  Slight convex up upper border on T2  3/21/22 CT Chest: thyroid appears normal;   9/16/22 CT abdomen with contrast - no kidney stones  10/10/22 DXA lowest T-score -1.2 right femoral neck   11/7/2022 123I sestamibi subtraction SPECT - localizes to the left   7/12/23 parathyroid US following appt - compared with 10/26/22 parathyroid US:   Right thyroid nodule # 1 0.5 x 0.3 x  Was  0.5 x 0.3 x 0.6 cm   right # 2  0.5 x 0.6 x  0.6 cm Was 0.8 x 0.5 x 0.4 - by stills.  This is again not seen on cine .  It is also more superior in location than typically seen  Left cine does not show parathyroid candidate today - last study it showed ? Parathyroid candidate inferiorly - but only seen in trans, not long     REVIEW OF SYSTEMS    Persistent cough despite not having cough when she had covid  GI: both diarrhea and constipation; no heartburn at night   No bone pain   + left hip pain x weeks    Past Medical History  Past Medical History:   Diagnosis Date    Arthritis     Fibromyalgia, ? Osteoarthritis    Back injury     Micro discectomy, approx date 1983    Glaucoma     Hiatal hernia     HTN (hypertension)     Hypercalcemia 11/30/2019    Insomnia 12/26/2012     Problem list name updated by automated process. Provider to review    Reduced vision  09/2016    L vision block, bilateral elevated eye presures,poss. Glacom    Uncomplicated asthma     Diagnosed as 3 yo     Past Surgical History:   Procedure Laterality Date     cataract extraction with intraocular lens implant Right 01/31/2019    BACK SURGERY  1984    micro-disc-ectomy    BREAST SURGERY  1984    L Breast bx    cataract extraction with intraocular lens Left 12/06/2018    CHOLECYSTECTOMY  1990    IRIDOTOMY/IRIDECTOMY LASER SURGERY Left 09/27/2016    IRIDOTOMY/IRIDECTOMY LASER SURGERY Right 10/18/2016    TUBAL LIGATION  1989    Z STOMACH SURGERY PROCEDURE UNLISTED  03/1990    Cholecystectomy, 11/1989 tubal ligation     Medications  Current Outpatient Medications   Medication Sig Dispense Refill    albuterol (VENTOLIN HFA) 108 (90 Base) MCG/ACT inhaler Inhale 2 puffs into the lungs every 6 hours as needed for shortness of breath / dyspnea 6.7 g 1    alendronate (FOSAMAX) 70 MG tablet Take 1 tablet (70 mg) by mouth every 7 days Take 60 minutes before am meal with 8 oz. water. Remain upright for 30 minutes. 12 tablet 3    amLODIPine (NORVASC) 5 MG tablet TAKE 1 TABLET BY MOUTH AT BEDTIME 90 tablet 2    atorvastatin (LIPITOR) 20 MG tablet Take 1 tablet (20 mg) by mouth daily 90 tablet 3    azelastine (ASTELIN) 0.1 % nasal spray Spray 1 spray into both nostrils 2 times daily      Calcium Carbonate Antacid (TUMS PO) Take  by mouth At Bedtime.      fluticasone-salmeterol (ADVAIR-HFA) 230-21 MCG/ACT inhaler       ibuprofen (ADVIL/MOTRIN) 100 MG tablet Take 100 mg by mouth every 4 hours as needed      latanoprost (XALATAN) 0.005 % ophthalmic solution Place 1 drop into the right eye At Bedtime      losartan (COZAAR) 50 MG tablet TAKE 1 TABLET BY MOUTH AT BEDTIME 90 tablet 2    montelukast (SINGULAIR) 10 MG tablet TAKE ONE TABLET BY MOUTH EVERY DAY AS DIRECTED  1    mupirocin (BACTROBAN) 2 % external ointment Apply topically 3 times daily      nitroGLYcerin (NITROSTAT) 0.4 MG sublingual tablet Place 0.4 mg  under the tongue every 5 minutes as needed for chest pain For chest pain place 1 tablet under the tongue every 5 minutes for 3 doses. If symptoms persist 5 minutes after 1st dose call 911.       0ne tums every night  She is not on vitamin D     Allergies  Allergies   Allergen Reactions    Canola Oil [Vegetable Oil] Anaphylaxis and GI Disturbance    Animal Dander Unknown    Dust Mites Unknown    Grass     Hydroxyzine Other (See Comments)     Passed out        Hydroxyzine Hcl     Pollen Extract Unknown    Trees     Chlorhexidine Itching and Rash     Hibiclens       Family History  Family History   Problem Relation Age of Onset    Heart Disease Mother     Diabetes Mother     Coronary Artery Disease Mother     Hypertension Mother     Anesthesia Reaction Mother         Anaphylaxis, oral opioixd    Asthma Mother     Thyroid Disease Mother     Low Back Problems Mother     Heart Disease Father     Diabetes Father     Coronary Artery Disease Father     Hypertension Father     Hyperlipidemia Father     Low Back Problems Father     Diabetes Sister     Hypertension Sister     Low Back Problems Sister     Thyroid Disease Sister     Hip fracture Sister     Low Back Problems Brother     Asthma Maternal Grandmother     Coronary Artery Disease Maternal Grandfather     Cerebrovascular Disease Paternal Grandfather     Asthma Daughter     Genetic Disease Daughter         Kallman syndrome; no olfactory bulb;    Calcium Disorder No family hx of      Social History  Social History     Tobacco Use    Smoking status: Never    Smokeless tobacco: Never   Substance Use Topics    Alcohol use: Yes     Alcohol/week: 0.0 standard drinks of alcohol     Comment: 0 -2 times a year    Drug use: No     Retired nurse;     Physical Exam  /72   Pulse 69   Wt 76.9 kg (169 lb 9.6 oz)   LMP  (LMP Unknown)   SpO2 95%   BMI 28.66 kg/m    Body mass index is 28.66 kg/m .  GENERAL : pleasant woman in  In no apparent distress.    SKIN: Normal color,  normal temperature, texture.       EYES: PER, EOMI, No scleral icterus,  No proptosis, conjunctival redness, stare, retraction  NECK: No visible masses. No palpable adenopathy, or masses.   THYROID:  Not palpable;   RESP: Lungs clear to auscultation bilaterally  CARDIAC: Regular rate and rhythm, normal S1 S2, without murmurs, rubs or gallops      NEURO: awake, alert, responds appropriately to questions.    Moves all extremities;    EXTREMITIES: No clubbing, cyanosis or edema.    DATA REVIEW     Latest Ref [facility] 10/4/2022  10:23 PM 10/5/2022  1:21 AM   ENDO CALCIUM LABS-UMP      Vitamin D Deficiency screening 20 - 75 ug/L     Albumin 3.4 - 5.0 g/dL     Albumin 3.5 - 5.2 g/dL 4.5     Alkaline Phosphatase 35 - 104 U/L 87     CALCIUM IONIZED WB 4.4 - 5.2 mg/dL  4.8    Calcium 8.8 - 10.2 mg/dL 10.4 (H)     Calcium Urine g/24 h 0.10 - 0.30 g/spec     Calcium Urine mg/dL mg/dL     Urea Nitrogen 7 - 30 mg/dL     Urea Nitrogen 8.0 - 23.0 mg/dL 12.7     Creatinine 0.51 - 0.95 mg/dL 0.81     Lipase 13 - 60 U/L 18     Magnesium 1.7 - 2.3 mg/dL 2.0     Parathyroid Hormone Intact 15 - 65 pg/mL        Latest Ref [facility] 2/17/2023  9:50 AM 5/15/2023  10:21 AM   ENDO CALCIUM LABS-UMP      Vitamin D Deficiency screening 20 - 75 ug/L 17 (L)     Albumin 3.4 - 5.0 g/dL     Albumin 3.5 - 5.2 g/dL 4.4     Alkaline Phosphatase 35 - 104 U/L 79     CALCIUM IONIZED WB 4.4 - 5.2 mg/dL     Calcium 8.8 - 10.2 mg/dL 10.5 (H)  10.4 (H)    Calcium Urine g/24 h 0.10 - 0.30 g/spec     Calcium Urine mg/dL mg/dL     Urea Nitrogen 7 - 30 mg/dL     Urea Nitrogen 8.0 - 23.0 mg/dL 14.6     Creatinine 0.51 - 0.95 mg/dL 0.77  0.78    Lipase 13 - 60 U/L     Magnesium 1.7 - 2.3 mg/dL     Parathyroid Hormone Intact 15 - 65 pg/mL  58       Latest Ref Rng 5/16/2023  11:16 AM   ENDO CALCIUM LABS-UMP     Vitamin D Deficiency screening 20 - 75 ug/L    Albumin 3.4 - 5.0 g/dL    Albumin 3.5 - 5.2 g/dL    Alkaline Phosphatase 35 - 104 U/L    CALCIUM IONIZED WB 4.4 - 5.2  mg/dL    Calcium 8.8 - 10.2 mg/dL    Calcium Urine g/24 h 0.10 - 0.30 g/spec 0.26    Calcium Urine mg/dL mg/dL 7.4    Urea Nitrogen 7 - 30 mg/dL    Urea Nitrogen 8.0 - 23.0 mg/dL    Creatinine 0.51 - 0.95 mg/dL    Lipase 13 - 60 U/L    Magnesium 1.7 - 2.3 mg/dL    Parathyroid Hormone Intact 15 - 65 pg/mL       Legend:  (H) High  (L) Low    US THYROID 10/26/2022 3:00 PMCOMPARISON: None available.HISTORY: Serum calcium elevated; Elevated parathyroid hormone   FINDINGS:   Thyroid parenchyma: heterogenous  The right lobe of the thyroid measures: 1.8 x 1.4 x 4.3 cm  The left lobe of the thyroid measures: 1.2 x 1.6 x 4.0 cm  The thyroid isthmus measures: 2.2 mm     Nodule 1:  Lobe: Right  Location: Mid lobe  Size: 0.5 x 0.3 x 0.6 cm  Composition: Spongiform (0 points)  Echogenicity: Hyperechoic or isoechoic (1 point)  Shape: Wider than tall (0 points)  Margin: Smooth (0 points)  Echogenic Foci: None or large comet tail artifact (0 points)  Stability: Not previously imaged  TIRADS: TR2 (1-2 points) Not suspicious     Posteromedial to the right thyroid lobe there is a 0.5 x 0.8 x 0.4 cmhypoechoic lesion with no definite involvement of the thyroid parenchyma.                                                     Impression: 0.8 cm hypoechoic lesion posteromedial to the rightthyroid lobe, which may represent a parathyroid adenoma. Consider  further evaluation with technetium 99 sestamibi scan     ACR TI-RADS recommendations  TR2 (2 points) & TR1 (0 points) -No FNA or follow-up  TR3 (3 points) - FNA if ? 2.5cm, follow-up if 1.5 -2.4 cm in 1, 3 and5 years  TR4 (4-6 points) - FNA if ? 1.5cm, follow-up if 1 -1.4 cm in 1, 2, 3and 5 years  TR5 (?7 points) - FNA if ? 1cm, follow-up if 0.5 -0.9 cm every yearfor 5 years   I have personally reviewed the examination and initial interpretationand I agree with the findings.  LUMA HZANG MD     Examination: Nuclear medicine parathyroid examination with SPECT CTand High Resolution CT  images of the Neck.  Date:  11/7/2022 3:16 PM   Indication:  Serum calcium elevated; Elevated parathyroid hormone   Comparison: Ultrasound 10/26/2022  Technique:  The patient received 8.29 mCi of I-123 orally and 23.4 mCi of Tc-99Cardiolite.  Three separate phases of images were obtained.  1. Dynamic images were obtained of the thyroid gland for 30 minutesfollowing Cardiolite administration.   2. Iodine images were obtained of the thyroid  followed by manualsubtraction from the Cardiolite images.   3. SPECT CT images were also obtained of the thyroid using a dualenergy technique for both I-123 and for Tc-99m followed by 3 mm high resolution CT images of the neck from the base of the skull to thebase of the heart.  After normalization of the I-123 images to the  Tc-99m- Cardiolite images the reconstructed axial slices weresubtracted, yielding neck images highlighting abnormally perfused  tissues.  Findings:The images of the thyroid gland on both the I-123 images and on the Tc-99m Cardiolite images demonstrates homogeneous appearance of the gland. There are no thyroid lesions identified. The SPECT CT imageswith subtraction images demonstrates a subtle focal area uptakeposterior medial to the superior aspect of the h left thyroid glandseries 3 image 105.                                                             Impression:  Focal uptake along the superior posterior medial aspect of the leftthyroid gland. While the suspected parathyroid adenoma is not clearly  delineated on CT, this is the most likely location based on perfusion.Note this location is contralateral to the finding reported on  ultrasound 10/26/22.  I have personally reviewed the examination and initial interpretationand I agree with the findings.  STEPHANIE ADHIKARI MD     EXAMINATION: Chest CT  3/28/2023 10:19 AM  CLINICAL HISTORY: Recurrent cough; Moderate persistent asthma withexacerbation  COMPARISON: CTA chest 3/21/2022.  TECHNIQUE: CT imaging  obtained through the chest without contrast.Axial, coronal, and sagittal reconstructions and axial MIP reformattedimages are reviewed.   CONTRAST: None  FINDINGS:  Lungs: The airway is patent. Minimal subpleuralreticulation/groundglass opacities in the inferior lateral rightmiddle lobe and inferior lateral lingula. No airspace consolidation.No pleural effusion or pneumothorax. Right lower lobe calcified  granulomas. Several sub-4 mm solid pulmonary nodules, for example 2 mm  solid nodule in the right lower lobe (series 4, image 124).  Mediastinum: The thyroid is unremarkable. Cardiac size is normal.Normal caliber aorta and main pulmonary artery. No pericardial  effusion. Mild coronary artery calcium. No thoracic lymphadenopathy.Partially calcified right hilar nodes. Esophagus is normal in caliber.  Bones and soft tissues: No suspicious bone findings. Mild degenerativechanges of the thoracic spine and shoulders.  Upper Abdomen: Limited evaluation of the upper abdomen. Smallaccessory splenule adjacent to the pancreatic tail.                                                            IMPRESSION:   Minimal subpleural reticulation and groundglass opacities in the inferolateral right middle lobe and lingula. The findings suggest  subpleural non-fibrotic mild early interstitial lung process such as NSIP or organizing pneumonia, chronic eosinophilic pneumonia or  infection. Otherwise no abnormal findings in the chest to explain the  patient's recurrent cough and continued follow up suggested.  I have personally reviewed the examination and initial interpretationand I agree with the findings.  PENELOPE COLEMAN MD

## 2024-01-10 NOTE — PHARMACOTHERAPY INTERVENTION NOTE - COMMENTS
Medication reconciliation completed.  Patient was unable to provide medication information, spoke to daughter Leeann ([phone]) and they provided current medication list; confirmed with Dr. First MedHx.  Medication reconciliation completed.  Patient was unable to provide medication information, spoke to daughter Leeann ([phone]) and they provided current medication list; confirmed with Dr. First MedHx.

## 2024-01-11 PROBLEM — I10 ESSENTIAL (PRIMARY) HYPERTENSION: Chronic | Status: ACTIVE | Noted: 2022-12-15

## 2024-01-11 NOTE — DIETITIAN NUTRITION RISK NOTIFICATION - ADDITIONAL COMMENTS/DIETITIAN RECOMMENDATIONS
Liberalize diet to regular to maximize caloric and nutrient intake.  Add ensure plus high protein TID to optimize PO intake (provides 350 kcal, 20g protein/ shake)  Yunior BID (provides 90 kcal, 2.5 g collagen, 7 g L-Arginine, 7 g L-Glutamine/ 1 packet) to promote wound healing   Monitor bowel movements, if no BM for >3 days, consider implementing bowel regimen.  Recommend to add MVI w/minerals, Vit C 500 mg BID, add Zinc Sulfate 220 mg x 10 days to promote wound healing.  Consider adding thiamine 200 mg daily 2/2 poor PO intake/ malnutrition  Maintain aspiration precautions, back of bed >35 degrees.  Confirm goals of care regarding nutrition support.   RD will continue to monitor PO intake, labs, hydration, and wt prn.   Liberalize diet to regular to maximize caloric and nutrient intake.  Add ensure plus high protein TID to optimize PO intake (provides 350 kcal, 20g protein/ shake)  Yunoir BID (provides 90 kcal, 2.5 g collagen, 7 g L-Arginine, 7 g L-Glutamine/ 1 packet) to promote wound healing   Monitor bowel movements, if no BM for >3 days, consider implementing bowel regimen.  Recommend to add MVI w/minerals, Vit C 500 mg BID, add Zinc Sulfate 220 mg x 10 days to promote wound healing.  Consider adding thiamine 200 mg daily 2/2 poor PO intake/ malnutrition  Maintain aspiration precautions, back of bed >35 degrees.  Confirm goals of care regarding nutrition support.   RD will continue to monitor PO intake, labs, hydration, and wt prn.

## 2024-01-11 NOTE — DIETITIAN INITIAL EVALUATION ADULT - NSFNSPHYEXAMSKINFT_GEN_A_CORE
Pressure Injury 1: sacrum, Stage I  Pressure Injury 2: Left:, shin, Suspected deep tissue injury  Pressure Injury 3: Right:, shin, Suspected deep tissue injury  Pressure Injury 4: none, none  Pressure Injury 5: none, none  Pressure Injury 6: none, none  Pressure Injury 7: none, none  Pressure Injury 8: none, none  Pressure Injury 9: none, none  Pressure Injury 10: none, none  Pressure Injury 11: none, none Pressure Injury 1: sacrum, Stage I  Pressure Injury 2: Left:, shin, Suspected deep tissue injury  Pressure Injury 3: Right:, shin, Suspected deep tissue injury

## 2024-01-11 NOTE — DIETITIAN INITIAL EVALUATION ADULT - ADD RECOMMEND
Liberalize diet to regular to maximize caloric and nutrient intake.  Ensure plus 1x day  Monitor bowel movements, if no BM for >3 days, consider implementing bowel regimen.  Recommend to add MVI w/minerals, Vit C 500 mg BID, add Zinc Sulfate 220 mg x 10 days to promote wound healing.  Consider adding thiamine 200 mg daily 2/2 poor PO intake/ malnutrition  Maintain aspiration precautions, back of bed >35 degrees.  Confirm goals of care regarding nutrition support.   RD will continue to monitor PO intake, labs, hydration, and wt prn.   Liberalize diet to regular to maximize caloric and nutrient intake.  Add ensure plus high protein TID to optimize PO intake (provides 350 kcal, 20g protein/ shake)  Yunior BID (provides 90 kcal, 2.5 g collagen, 7 g L-Arginine, 7 g L-Glutamine/ 1 packet) to promote wound healing   Monitor bowel movements, if no BM for >3 days, consider implementing bowel regimen.  Recommend to add MVI w/minerals, Vit C 500 mg BID, add Zinc Sulfate 220 mg x 10 days to promote wound healing.  Consider adding thiamine 200 mg daily 2/2 poor PO intake/ malnutrition  Maintain aspiration precautions, back of bed >35 degrees.  Confirm goals of care regarding nutrition support.   RD will continue to monitor PO intake, labs, hydration, and wt prn.

## 2024-01-11 NOTE — DIETITIAN INITIAL EVALUATION ADULT - NAME AND PHONE
Rena Frederick RDN, CDN, [facility]      [phone]   [email or web address]  Rena Frederick RDN, CDN, [facility]      [phone]   [email or web address]

## 2024-01-11 NOTE — H&P ADULT - HISTORY OF PRESENT ILLNESS
89 y/o Female with PMHx of HTN, HLD presented to the ED BIBEMS from home with complain of increasing weakness/lethargy and decreased PO intake yesterday. Patient was unable to get out of bed yesterday. As per EMS, patient lives with niece, and niece started to notice increased weakness and lethargy yesterday. Patient denies  chest pain, SOB, abd pain, cough, fevers, dysuria. Niece says this has been going on for 3x days.     89 y/o Female with PMHx of HTN, HLD presented to the ED BIBEMS from home with complain of increasing weakness/lethargy and decreased PO intake yesterday. Patient was unable to get out of bed yesterday. As per EMS, patient lives with niece, and niece started to notice increased weakness and lethargy yesterday. Patient denies  chest pain, SOB, abd pain, cough, fevers, dysuria. Niece says this has been going on for 3x days.

## 2024-01-11 NOTE — PATIENT PROFILE ADULT - FALL HARM RISK - HARM RISK INTERVENTIONS
Assistance with ambulation/Assistance OOB with selected safe patient handling equipment/Communicate Risk of Fall with Harm to all staff/Discuss with provider need for PT consult/Monitor gait and stability/Provide patient with walking aids - walker, cane, crutches/Reinforce activity limits and safety measures with patient and family/Tailored Fall Risk Interventions/Visual Cue: Yellow wristband and red socks/Bed in lowest position, wheels locked, appropriate side rails in place/Call bell, personal items and telephone in reach/Instruct patient to call for assistance before getting out of bed or chair/Non-slip footwear when patient is out of bed/[place] to call system/Physically safe environment - no spills, clutter or unnecessary equipment/Purposeful Proactive Rounding/Room/bathroom lighting operational, light cord in reach Assistance with ambulation/Assistance OOB with selected safe patient handling equipment/Communicate Risk of Fall with Harm to all staff/Discuss with provider need for PT consult/Monitor gait and stability/Provide patient with walking aids - walker, cane, crutches/Reinforce activity limits and safety measures with patient and family/Tailored Fall Risk Interventions/Visual Cue: Yellow wristband and red socks/Bed in lowest position, wheels locked, appropriate side rails in place/Call bell, personal items and telephone in reach/Instruct patient to call for assistance before getting out of bed or chair/Non-slip footwear when patient is out of bed/[place] to call system/Physically safe environment - no spills, clutter or unnecessary equipment/Purposeful Proactive Rounding/Room/bathroom lighting operational, light cord in reach

## 2024-01-11 NOTE — DIETITIAN INITIAL EVALUATION ADULT - OTHER INFO
89 y/o Female with PMHx of HTN, HLD presented to the ED BIBEMS from home with complain of increasing weakness/lethargy and decreased PO intake yesterday. Patient was unable to get out of bed yesterday. As per EMS, patient lives with niece, and niece started to notice increased weakness and lethargy yesterday. Patient denies  chest pain, SOB, abd pain, cough, fevers, dysuria. Niece says this has been going on for 3x days. 89 y/o Female with PMHx of HTN, HLD presented to the ED BIBEMS from home with complain of increasing weakness/lethargy and decreased PO intake yesterday. Patient was unable to get out of bed yesterday. As per EMS, patient lives with niece, and niece started to notice increased weakness and lethargy yesterday. Patient denies  chest pain, SOB, abd pain, cough, fevers, dysuria. Niece says this has been going on for 3x days. 89 y/o Female with PMHx of HTN, HLD presented to the ED BIBEMS from home with complain of increasing weakness/lethargy and decreased PO intake yesterday. Patient was unable to get out of bed yesterday. As per EMS, patient lives with niece, and niece started to notice increased weakness and lethargy yesterday. Patient denies  chest pain, SOB, abd pain, cough, fevers, dysuria. Niece says this has been going on for 3x days.    Admit dx is Unsteadiness on feet.   Unable to get RD bedscale wt   EMR wt is 51 kg   113  Wt on 12./8 (bedscale) was 48 kg  106#  Visited pt at bedside, pt was lethargic  NFPE reveals severe muscle wasting, fat wasting   Pt previously diagnosed with severe malnutrition  suggest Confirm Goals of Care regarding nutrition support   as pt may need nutritional support to meet ENN  Suggest add Ensure plus bid  Recommendations to follow in Plan/Intervention  89 y/o Female with PMHx of HTN, HLD presented to the ED BIBEMS from home with complain of increasing weakness/lethargy and decreased PO intake yesterday. Patient was unable to get out of bed yesterday. As per EMS, patient lives with niece, and niece started to notice increased weakness and lethargy yesterday. Patient denies  chest pain, SOB, abd pain, cough, fevers, dysuria. Niece says this has been going on for 3x days.    Admit dx is Unsteadiness on feet.   Unable to get RD bedscale wt   EMR wt is 51 kg   113  Wt on 12./8 (bedscale) was 48 kg  106#  Visited pt at bedside, pt was lethargic  NFPE reveals severe muscle wasting, fat wasting   Pt previously diagnosed with severe malnutrition  suggest Confirm Goals of Care regarding nutrition support   as pt may need nutritional support to meet ENN  Suggest add Ensure plus bid  Recommendations to follow in Plan/Intervention

## 2024-01-11 NOTE — H&P ADULT - NSHPPHYSICALEXAM_GEN_ALL_CORE
T(C): 36.3 (01-11-24 @ 03:43), Max: 36.6 (01-10-24 @ 20:00)  HR: 53 (01-11-24 @ 03:43) (53 - 68)  BP: 119/62 (01-11-24 @ 03:43) (113/65 - 156/87)  RR: 18 (01-11-24 @ 03:43) (15 - 25)  SpO2: 97% (01-11-24 @ 03:43) (96% - 100%)    CONSTITUTIONAL: Well groomed, no apparent distress  EYES: PERRLA and symmetric, EOMI, No conjunctival or scleral injection, non-icteric  ENMT: Oral mucosa with moist membranes. Normal dentition; no pharyngeal injection or exudates             NECK: Supple, symmetric and without tracheal deviation   RESP: No respiratory distress, no use of accessory muscles; CTA b/l, no WRR  CV: RRR, +S1S2, no MRG; no JVD; no peripheral edema  GI: Soft, NT, ND, no rebound, no guarding; no palpable masses; no hepatosplenomegaly; no hernia palpated  LYMPH: No cervical LAD or tenderness;   MSK: Decreased ROM of the lower extremities, normal muscle strength/tone  SKIN: No rashes or ulcers noted;   NEURO: CN II-XII intact; normal reflexes in upper and lower extremities, sensation intact in upper and lower extremities b/l to light touch   PSYCH: Appropriate insight/judgment; A+O x 3, mood and affect appropriate,

## 2024-01-11 NOTE — PHYSICAL THERAPY INITIAL EVALUATION ADULT - PERTINENT HX OF CURRENT PROBLEM, REHAB EVAL
89 y/o Female with PMHx of HTN, HLD presented to the ED BIBEMS from home with complain of increasing weakness/lethargy and decreased PO intake yesterday. Patient was unable to get out of bed yesterday. As per EMS, patient lives with niece, and niece started to notice increased weakness and lethargy yesterday. 89 y/o Female with PMHx of HTN, HLD presented to the ED BIBEMS from home with complain of increasing weakness/lethargy and decreased PO intake yesterday. Patient was unable to get out of bed yesterday. As per EMS, patient lives with niece, and niece started to notice increased weakness and lethargy yesterday.

## 2024-01-11 NOTE — PHYSICAL THERAPY INITIAL EVALUATION ADULT - GENERAL OBSERVATIONS, REHAB EVAL
The pt was received on 2S, supine, pleasant and cooperative but intermittently confused and forgetful. The pt hopes to get OOB and in a chair with PT.

## 2024-01-11 NOTE — CHART NOTE - NSCHARTNOTEFT_GEN_A_CORE
Reviewed H+P.      Patient admitted overnight for weakness and acute kidney injury.    Patient with evidence of acute infection.    Improved creatinine after IV fluids and appears more alert and oriented this AM.    Will monitor 1 more day and recheck bmp tomorrow AM.    Probable d/c back to home tomorrow with home PT.      Discussed with chip Lance over phone

## 2024-01-11 NOTE — DIETITIAN INITIAL EVALUATION ADULT - FEEDING ASSISTANCE
Tray set-up, open all containers; meal encouragement   Tray set-up, open all containers, encourage po intake

## 2024-01-11 NOTE — DIETITIAN INITIAL EVALUATION ADULT - ORAL NUTRITION SUPPLEMENTS
Add magic cup 1x/day  Add ensure plus high protein TID to optimize PO intake (provides 350 kcal, 20g protein/ shake)   Yunior BID (provides 90 kcal, 2.5 g collagen, 7 g L-Arginine, 7 g L-Glutamine/ 1 packet) to promote wound healing

## 2024-01-11 NOTE — DIETITIAN INITIAL EVALUATION ADULT - ORAL INTAKE PTA/DIET HISTORY
Pt was somewhat lethargic this AM.  Pt is from home, lives with her niece.  PO intake estimated likely< 75% ENN > one month

## 2024-01-11 NOTE — DIETITIAN INITIAL EVALUATION ADULT - PERTINENT MEDS FT
MEDICATIONS  (STANDING):  dorzolamide 2%/timolol 0.5% Ophthalmic Solution 1 Drop(s) Both EYES two times a day  latanoprost 0.005% Ophthalmic Solution 1 Drop(s) Both EYES at bedtime  metoprolol tartrate 25 milliGRAM(s) Oral three times a day    MEDICATIONS  (PRN):  acetaminophen     Tablet .. 650 milliGRAM(s) Oral every 6 hours PRN Temp greater or equal to 38C (100.4F), Mild Pain (1 - 3)  aluminum hydroxide/magnesium hydroxide/simethicone Suspension 30 milliLiter(s) Oral every 4 hours PRN Dyspepsia  melatonin 3 milliGRAM(s) Oral at bedtime PRN Insomnia  ondansetron Injectable 4 milliGRAM(s) IV Push every 8 hours PRN Nausea and/or Vomiting

## 2024-01-11 NOTE — DIETITIAN INITIAL EVALUATION ADULT - PERTINENT LABORATORY DATA
01-10    138  |  102  |  34<H>  ----------------------------<  158<H>  4.4   |  23  |  1.56<H>    Ca    9.1      10 Monico 2024 20:24  Mg     2.2     01-10    TPro  7.4  /  Alb  3.7  /  TBili  0.8  /  DBili  x   /  AST  32  /  ALT  24  /  AlkPhos  106  01-10  POCT Blood Glucose.: 162 mg/dL (01-10-24 @ 20:01)

## 2024-01-11 NOTE — H&P ADULT - NSHPREVIEWOFSYSTEMS_GEN_ALL_CORE
Gen: No fever, chills, ++ weakness  ENT: No visual changes or throat pain  Neck: No pain or stiffness  Respiratory: No cough or wheezing  Cardiovascular: No chest pain or palpitations  Gastrointestinal: No abdominal pain, nausea, vomiting, constipation, or diarrhea  Hematologic: No easy bleeding or bruising  Neurologic: No numbness or focal weakness  Psych: No depression or insomnia  Skin: No rash or itching

## 2024-01-11 NOTE — H&P ADULT - ASSESSMENT
A/P:    1.  Acute Kidney Injury  -got IVF in ED  -follow Cr level at am  -follow clinically    2.  Weakness  Unable to ambulate  -follow PT eval  -follow Knee X rays  -keep on fall precautions    3.  SCD for DVT ppx    4.  Code status  -Full code

## 2024-01-12 NOTE — CHART NOTE - NSCHARTNOTEFT_GEN_A_CORE
Called to a CODE BLUE    Upon arrival good CPR in progress.    First Epi Given  At Pulse Check I intubated with a 7.7 ET tube  Good Color Change  Patient regained a pulse and had agonal respirations    Hospitalist called the Family and they Wanted a DNR in place.  No esclation of Care.      Patient lost her pulse and was in PEA    TOD 10:02AM  Hospitalist notified Family    Time 30 min

## 2024-01-12 NOTE — DISCHARGE NOTE FOR THE EXPIRED PATIENT - HOSPITAL COURSE
Hospital Course  HPI:  89 y/o Female with PMHx of HTN, HLD presented to the ED BIBEMS from home with complain of increasing weakness/lethargy and decreased PO intake yesterday. Patient was unable to get out of bed yesterday. As per EMS, patient lives with niece, and niece started to notice increased weakness and lethargy yesterday. Patient denies  chest pain, SOB, abd pain, cough, fevers, dysuria. Niece says this has been going on for 3x days.     (11 Jan 2024 02:53)    Patient was admitted for weakness and lethargy.   She underwent evaluation for an infectious cause but your workup was negative.      On 1/12/24 around 10AM patient found to be pulseless and ACLS initiated.  Family informed and declined escalation.   Patient passed at 10:02 AM of cardiopulmonary arrest.

## 2024-01-16 LAB
CULTURE RESULTS: SIGNIFICANT CHANGE UP
CULTURE RESULTS: SIGNIFICANT CHANGE UP
SPECIMEN SOURCE: SIGNIFICANT CHANGE UP
SPECIMEN SOURCE: SIGNIFICANT CHANGE UP

## 2024-01-18 DIAGNOSIS — I10 ESSENTIAL (PRIMARY) HYPERTENSION: ICD-10-CM

## 2024-01-18 DIAGNOSIS — Z11.52 ENCOUNTER FOR SCREENING FOR COVID-19: ICD-10-CM

## 2024-01-18 DIAGNOSIS — E78.5 HYPERLIPIDEMIA, UNSPECIFIED: ICD-10-CM

## 2024-01-18 DIAGNOSIS — I46.9 CARDIAC ARREST, CAUSE UNSPECIFIED: ICD-10-CM

## 2024-01-18 DIAGNOSIS — N17.9 ACUTE KIDNEY FAILURE, UNSPECIFIED: ICD-10-CM

## 2024-01-18 DIAGNOSIS — Z66 DO NOT RESUSCITATE: ICD-10-CM

## 2024-01-18 DIAGNOSIS — R26.81 UNSTEADINESS ON FEET: ICD-10-CM

## 2024-08-26 NOTE — DISCHARGE NOTE FOR THE EXPIRED PATIENT - DOES DEATH MEET AT LEAST ONE CRITERION FOR HOSPITAL AUTOPSY?
Right sided paracentesis performed by Dr Hinojosa. Right lower quadrant access site. 3800 Mls of fluid removed and sent for testing. Gauze and tegaderm placed on access site. Report called to GENARO Mark. Patient placed in transport to return to [facility]   No

## 2024-10-11 NOTE — ED ADULT NURSE NOTE - TEMPLATE LIST FOR HEAD TO TOE ASSESSMENT
Please call patient to schedule an appointment with fasting Lipid  Medication was refilled     General

## 2025-07-14 NOTE — PHYSICAL THERAPY INITIAL EVALUATION ADULT - NAME OF CLINICIAN
Quality 47: Advance Care Plan: Advance Care Planning discussed and documented in the medical record; patient did not wish or was not able to name a surrogate decision maker or provide an advance care plan. Quality 226: Preventive Care And Screening: Tobacco Use: Screening And Cessation Intervention: Patient screened for tobacco use and is an ex/non-smoker Quality 130: Documentation Of Current Medications In The Medical Record: Current Medications Documented Quality 431: Preventive Care And Screening: Unhealthy Alcohol Use - Screening: Patient not identified as an unhealthy alcohol user when screened for unhealthy alcohol use using a systematic screening method Detail Level: Detailed NEW Angela